# Patient Record
Sex: MALE | Race: WHITE | NOT HISPANIC OR LATINO | Employment: FULL TIME | URBAN - METROPOLITAN AREA
[De-identification: names, ages, dates, MRNs, and addresses within clinical notes are randomized per-mention and may not be internally consistent; named-entity substitution may affect disease eponyms.]

---

## 2021-08-27 ENCOUNTER — TELEPHONE (OUTPATIENT)
Dept: ONCOLOGY | Facility: CLINIC | Age: 46
End: 2021-08-27

## 2021-08-27 NOTE — TELEPHONE ENCOUNTER
HUB TO READ    FA at the hospital is trying to reach this patient, please have them call Mary Ellen Larry [phone], thanks!

## 2021-09-03 ENCOUNTER — LAB (OUTPATIENT)
Dept: OTHER | Facility: HOSPITAL | Age: 46
End: 2021-09-03

## 2021-09-03 ENCOUNTER — CONSULT (OUTPATIENT)
Dept: ONCOLOGY | Facility: CLINIC | Age: 46
End: 2021-09-03

## 2021-09-03 VITALS
HEIGHT: 73 IN | WEIGHT: 196.3 LBS | SYSTOLIC BLOOD PRESSURE: 116 MMHG | OXYGEN SATURATION: 94 % | TEMPERATURE: 98.4 F | DIASTOLIC BLOOD PRESSURE: 74 MMHG | HEART RATE: 96 BPM | BODY MASS INDEX: 26.02 KG/M2 | RESPIRATION RATE: 16 BRPM

## 2021-09-03 DIAGNOSIS — D72.829 LEUKOCYTOSIS, UNSPECIFIED TYPE: Primary | ICD-10-CM

## 2021-09-03 DIAGNOSIS — F17.219 CIGARETTE NICOTINE DEPENDENCE WITH NICOTINE-INDUCED DISORDER: ICD-10-CM

## 2021-09-03 PROBLEM — F17.200 NICOTINE DEPENDENCE: Status: ACTIVE | Noted: 2021-09-03

## 2021-09-03 LAB
BASOPHILS # BLD AUTO: 0.1 10*3/MM3 (ref 0–0.2)
BASOPHILS NFR BLD AUTO: 0.6 % (ref 0–1.5)
DEPRECATED RDW RBC AUTO: 45.1 FL (ref 37–54)
EOSINOPHIL # BLD AUTO: 0.4 10*3/MM3 (ref 0–0.4)
EOSINOPHIL NFR BLD AUTO: 2.4 % (ref 0.3–6.2)
ERYTHROCYTE [DISTWIDTH] IN BLOOD BY AUTOMATED COUNT: 13.7 % (ref 12.3–15.4)
HCT VFR BLD AUTO: 47.7 % (ref 37.5–51)
HGB BLD-MCNC: 15.8 G/DL (ref 13–17.7)
IMM GRANULOCYTES # BLD AUTO: 0.07 10*3/MM3 (ref 0–0.05)
IMM GRANULOCYTES NFR BLD AUTO: 0.4 % (ref 0–0.5)
LYMPHOCYTES # BLD AUTO: 3.87 10*3/MM3 (ref 0.7–3.1)
LYMPHOCYTES NFR BLD AUTO: 23.1 % (ref 19.6–45.3)
MCH RBC QN AUTO: 29.8 PG (ref 26.6–33)
MCHC RBC AUTO-ENTMCNC: 33.1 G/DL (ref 31.5–35.7)
MCV RBC AUTO: 89.8 FL (ref 79–97)
MONOCYTES # BLD AUTO: 0.87 10*3/MM3 (ref 0.1–0.9)
MONOCYTES NFR BLD AUTO: 5.2 % (ref 5–12)
NEUTROPHILS NFR BLD AUTO: 11.41 10*3/MM3 (ref 1.7–7)
NEUTROPHILS NFR BLD AUTO: 68.3 % (ref 42.7–76)
NRBC BLD AUTO-RTO: 0 /100 WBC (ref 0–0.2)
PLATELET # BLD AUTO: 275 10*3/MM3 (ref 140–450)
PMV BLD AUTO: 11.1 FL (ref 6–12)
RBC # BLD AUTO: 5.31 10*6/MM3 (ref 4.14–5.8)
WBC # BLD AUTO: 16.72 10*3/MM3 (ref 3.4–10.8)

## 2021-09-03 PROCEDURE — 85025 COMPLETE CBC W/AUTO DIFF WBC: CPT | Performed by: INTERNAL MEDICINE

## 2021-09-03 PROCEDURE — 99243 OFF/OP CNSLTJ NEW/EST LOW 30: CPT | Performed by: INTERNAL MEDICINE

## 2021-09-03 PROCEDURE — 36415 COLL VENOUS BLD VENIPUNCTURE: CPT

## 2021-09-03 RX ORDER — DICLOFENAC SODIUM 75 MG/1
75 TABLET, DELAYED RELEASE ORAL 2 TIMES DAILY
COMMUNITY
Start: 2021-07-08

## 2021-09-03 RX ORDER — HYDROCODONE BITARTRATE AND ACETAMINOPHEN 5; 325 MG/1; MG/1
1 TABLET ORAL EVERY 6 HOURS PRN
COMMUNITY
Start: 2021-06-24

## 2021-09-03 RX ORDER — TRAZODONE HYDROCHLORIDE 150 MG/1
150 TABLET ORAL
COMMUNITY
Start: 2021-06-14

## 2021-09-03 RX ORDER — ZOLPIDEM TARTRATE 12.5 MG/1
12.5 TABLET, FILM COATED, EXTENDED RELEASE ORAL
COMMUNITY
Start: 2021-08-19

## 2021-09-03 NOTE — PROGRESS NOTES
Subjective     REASON FOR CONSULTATION: Leukocytosis  Provide an opinion on any further workup or treatment                             REQUESTING PHYSICIAN: PATRICIA Samayoa    RECORDS OBTAINED:  Records of the patients history including those obtained from the referring provider were reviewed and summarized in detail.    HISTORY OF PRESENT ILLNESS:  The patient is a 45 y.o. year old male who is here for an opinion about the above issue.  He is referred to us from his primary care office for evaluation of mild leukocytosis.  He had recent lab work from 8/9/2021 showing a white count of 13.9 with 48% polys and 40% lymphocytes.  Hemoglobin and platelets were normal.  Upon review of older blood counts it appears that this is a chronic situation and is ranged from 12,000-17,000.    The patient is a longtime smoker smoking a pack a day currently.  My suspicion is that this is benign mild chronic leukocytosis due to smoking which is caused by demargination of the marginated neutrophil pool along the walls of the blood vessels.    History of Present Illness     Past Medical History:   Diagnosis Date   • Anxiety    • Hyperlipidemia    • Hypertension    • Type 2 diabetes mellitus (CMS/HCC)         History reviewed. No pertinent surgical history.     Current Outpatient Medications on File Prior to Visit   Medication Sig Dispense Refill   • diclofenac (VOLTAREN) 75 MG EC tablet Take 75 mg by mouth 2 (Two) Times a Day.     • HYDROcodone-acetaminophen (NORCO) 5-325 MG per tablet Take 1 tablet by mouth Every 6 (Six) Hours As Needed. for pain     • metFORMIN (GLUCOPHAGE) 500 MG tablet Take 500 mg by mouth 2 (Two) Times a Day With Meals.     • traZODone (DESYREL) 150 MG tablet Take 150 mg by mouth every night at bedtime.     • zolpidem CR (AMBIEN CR) 12.5 MG CR tablet Take 12.5 mg by mouth every night at bedtime.       No current facility-administered medications on file prior to visit.        ALLERGIES:  No Known Allergies  "    Social History     Socioeconomic History   • Marital status:      Spouse name: Angelita   • Number of children: Not on file   • Years of education: Not on file   • Highest education level: Not on file   Tobacco Use   • Smoking status: Current Every Day Smoker     Packs/day: 1.00     Years: 25.00     Pack years: 25.00     Types: Cigarettes        History reviewed. No pertinent family history.     Review of Systems   Constitutional: Negative for activity change, chills, fatigue and fever.   HENT: Negative for mouth sores, trouble swallowing and voice change.    Eyes: Negative for pain and visual disturbance.   Respiratory: Negative for cough, shortness of breath and wheezing.    Cardiovascular: Negative for chest pain and palpitations.   Gastrointestinal: Negative for abdominal pain, constipation, diarrhea, nausea and vomiting.   Genitourinary: Negative for difficulty urinating, frequency and urgency.   Musculoskeletal: Negative for arthralgias and joint swelling.   Skin: Negative for rash.   Neurological: Negative for dizziness, seizures, weakness and headaches.   Hematological: Negative for adenopathy. Does not bruise/bleed easily.   Psychiatric/Behavioral: Negative for behavioral problems and confusion. The patient is nervous/anxious.         Objective     Vitals:    09/03/21 1433   BP: 116/74   Pulse: 96   Resp: 16   Temp: 98.4 °F (36.9 °C)   TempSrc: Temporal   SpO2: 94%   Weight: 89 kg (196 lb 4.8 oz)   Height: 186 cm (73.23\")   PainSc: 0-No pain     No flowsheet data found.    Physical Exam  Constitutional:       General: He is not in acute distress.     Appearance: He is well-developed.   HENT:      Head: Normocephalic.   Eyes:      General: No scleral icterus.     Conjunctiva/sclera: Conjunctivae normal.      Pupils: Pupils are equal, round, and reactive to light.   Neck:      Thyroid: No thyromegaly.      Vascular: No JVD.   Cardiovascular:      Rate and Rhythm: Normal rate and regular rhythm.      " Heart sounds: No murmur heard.   No friction rub. No gallop.    Pulmonary:      Effort: Pulmonary effort is normal.      Breath sounds: Normal breath sounds. No wheezing or rales.   Abdominal:      General: There is no distension.      Palpations: Abdomen is soft. There is no mass.      Tenderness: There is no abdominal tenderness.   Musculoskeletal:         General: No deformity. Normal range of motion.      Cervical back: Normal range of motion and neck supple.   Lymphadenopathy:      Cervical: No cervical adenopathy.   Skin:     General: Skin is warm and dry.      Findings: No erythema or rash.   Neurological:      Mental Status: He is alert and oriented to person, place, and time.      Cranial Nerves: No cranial nerve deficit.      Deep Tendon Reflexes: Reflexes are normal and symmetric.   Psychiatric:         Behavior: Behavior normal.         Judgment: Judgment normal.           RECENT LABS:  Hematology WBC   Date Value Ref Range Status   09/03/2021 16.72 (H) 3.40 - 10.80 10*3/mm3 Final     RBC   Date Value Ref Range Status   09/03/2021 5.31 4.14 - 5.80 10*6/mm3 Final     Hemoglobin   Date Value Ref Range Status   09/03/2021 15.8 13.0 - 17.7 g/dL Final     Hematocrit   Date Value Ref Range Status   09/03/2021 47.7 37.5 - 51.0 % Final     Platelets   Date Value Ref Range Status   09/03/2021 275 140 - 450 10*3/mm3 Final          Assessment/Plan     1.  Mild chronic leukocytosis due to smoking.    Recommendations  1.  I reassured the patient that I do not see any signs of a serious hematologic disorder.  2.  We did discuss the health benefits of smoking cessation.  3.  At this point I do not think I would recommend any further work-up unless the patient were to stop smoking and continued to have significant leukocytosis afterward.    Thanks for asking us to see this nice gentleman in consultation.

## 2021-09-10 ENCOUNTER — TELEPHONE (OUTPATIENT)
Dept: ONCOLOGY | Facility: CLINIC | Age: 46
End: 2021-09-10

## 2021-09-10 NOTE — TELEPHONE ENCOUNTER
Provider: TREVOR    Caller:ADELAIDA    Relationship to Patient: SELF    Phone Number: [phone]    Reason for Call: PT REQUESTED DR MILLER EMAILED TO [email or web address] FOR DR VISIT WITH DR MURRAY ON 9/3/21

## 2022-02-23 ENCOUNTER — TELEPHONE (OUTPATIENT)
Dept: ONCOLOGY | Facility: CLINIC | Age: 47
End: 2022-02-23

## 2022-02-23 NOTE — TELEPHONE ENCOUNTER
Caller: Eric Lay    Relationship: Self        What was the call regarding:     CALLED REGARDING SOME BILLING AND MAKING PAYMENTS ON THAT, OFFERED TO GET HIM OVER TO BILLING DEPT, AND OFFERED TO GIVE HIM THERE NUMBER,     HE DID NOT WANT THE NUMBER BUT WANTED TO BE TRANSFERRED TO BILLING      WARM TRANSFERRED ERIC TO VICKI IN BILLING DEPT TO FURTHER ASSIST.

## 2024-10-01 ENCOUNTER — TELEPHONE (OUTPATIENT)
Dept: FAMILY MEDICINE CLINIC | Facility: CLINIC | Age: 49
End: 2024-10-01
Payer: MEDICAID

## 2024-10-01 NOTE — TELEPHONE ENCOUNTER
----- Message from [facility] sent at 10/1/2024  4:02 PM EDT -----  Regarding: Appointment Request ()  Contact: [phone]  Appointment Request From: Eric Lay    With Provider: Jeffery Etienne [[facility]]    Preferred Date Range: Any date 10/1/2024 or later    Preferred Times: Any    Reason: To address the following health maintenance concerns.  Pneumococcal Vaccine 0-64  Zoster Vaccine  Influenza Vaccine    Comments:  Thx

## 2024-10-02 ENCOUNTER — OFFICE VISIT (OUTPATIENT)
Dept: FAMILY MEDICINE CLINIC | Facility: CLINIC | Age: 49
End: 2024-10-02
Payer: MEDICAID

## 2024-10-02 VITALS
TEMPERATURE: 98.7 F | HEART RATE: 85 BPM | DIASTOLIC BLOOD PRESSURE: 90 MMHG | WEIGHT: 203 LBS | HEIGHT: 73 IN | SYSTOLIC BLOOD PRESSURE: 140 MMHG | BODY MASS INDEX: 26.9 KG/M2 | OXYGEN SATURATION: 97 %

## 2024-10-02 DIAGNOSIS — F43.10 PTSD (POST-TRAUMATIC STRESS DISORDER): ICD-10-CM

## 2024-10-02 DIAGNOSIS — F41.9 ANXIETY: ICD-10-CM

## 2024-10-02 DIAGNOSIS — G40.909 SEIZURE DISORDER: ICD-10-CM

## 2024-10-02 DIAGNOSIS — F32.A DEPRESSION, UNSPECIFIED DEPRESSION TYPE: ICD-10-CM

## 2024-10-02 DIAGNOSIS — M51.362 DEGENERATION OF INTERVERTEBRAL DISC OF LUMBAR REGION WITH DISCOGENIC BACK PAIN AND LOWER EXTREMITY PAIN: Primary | ICD-10-CM

## 2024-10-02 DIAGNOSIS — M50.30 DEGENERATIVE DISC DISEASE, CERVICAL: ICD-10-CM

## 2024-10-02 PROCEDURE — 1159F MED LIST DOCD IN RCRD: CPT | Performed by: FAMILY MEDICINE

## 2024-10-02 PROCEDURE — 1126F AMNT PAIN NOTED NONE PRSNT: CPT | Performed by: FAMILY MEDICINE

## 2024-10-02 PROCEDURE — 99204 OFFICE O/P NEW MOD 45 MIN: CPT | Performed by: FAMILY MEDICINE

## 2024-10-02 PROCEDURE — 1160F RVW MEDS BY RX/DR IN RCRD: CPT | Performed by: FAMILY MEDICINE

## 2024-10-02 RX ORDER — HYDROCODONE BITARTRATE AND ACETAMINOPHEN 7.5; 325 MG/1; MG/1
1 TABLET ORAL 3 TIMES DAILY
COMMUNITY
Start: 2024-08-20

## 2024-10-02 RX ORDER — MELOXICAM 15 MG/1
15 TABLET ORAL DAILY
COMMUNITY
Start: 2024-09-27

## 2024-10-02 RX ORDER — BUSPIRONE HYDROCHLORIDE 5 MG/1
5 TABLET ORAL 3 TIMES DAILY
Qty: 90 TABLET | Refills: 0 | Status: SHIPPED | OUTPATIENT
Start: 2024-10-02

## 2024-10-02 RX ORDER — SERTRALINE HYDROCHLORIDE 25 MG/1
25 TABLET, FILM COATED ORAL NIGHTLY
Qty: 30 TABLET | Refills: 1 | Status: SHIPPED | OUTPATIENT
Start: 2024-10-02
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
## 2024-10-03 ENCOUNTER — PATIENT ROUNDING (BHMG ONLY) (OUTPATIENT)
Dept: FAMILY MEDICINE CLINIC | Facility: CLINIC | Age: 49
End: 2024-10-03
Payer: MEDICAID

## 2024-10-18 ENCOUNTER — TELEPHONE (OUTPATIENT)
Dept: NEUROSURGERY | Facility: CLINIC | Age: 49
End: 2024-10-18

## 2024-10-18 NOTE — TELEPHONE ENCOUNTER
Hub staff attempted to follow warm transfer process and was unsuccessful     Caller: Eric Lay    Relationship to patient: Self    Best call back number: [phone]    Patient is needing: PATIENT CALLED TO RESCHEDULE HIS APPT. FROM THIS MORNING WITH -PATIENT IS ASKING OF THERE IS ANYTHING NEXT WEEK AVAILABLE AS HE IS IN A LOT OF PAIN-PATIENT IS ASKING FOR A CALL BACK TO DISCUSS-TRIED TO WARM TRANSFER AS APPT. WAS STILL IN CHART-UNABLE TO TRANSFER DUE TO NO ANSWER PATIENT ADVISED HE WILL EVEN SEE A DIFFERENT PROVIDER IF NEEDED-SENDING TO OFFICE TO ADVISE THANK YOU

## 2024-10-18 NOTE — TELEPHONE ENCOUNTER
I talked to Eric on the day of his appmt however he came off the elevator uncontrollably vomiting and then vomited in the trash can.  We gave emesis bags to throw up in however, I let him since he is so sick we would need re-schedule.  He stated he would not need to reschedule and he would find another doctor to go to.

## 2024-10-30 ENCOUNTER — OFFICE VISIT (OUTPATIENT)
Dept: FAMILY MEDICINE CLINIC | Facility: CLINIC | Age: 49
End: 2024-10-30
Payer: MEDICAID

## 2024-10-30 ENCOUNTER — TELEPHONE (OUTPATIENT)
Dept: FAMILY MEDICINE CLINIC | Facility: CLINIC | Age: 49
End: 2024-10-30

## 2024-10-30 VITALS
WEIGHT: 203 LBS | TEMPERATURE: 98.4 F | HEIGHT: 73 IN | SYSTOLIC BLOOD PRESSURE: 110 MMHG | HEART RATE: 70 BPM | DIASTOLIC BLOOD PRESSURE: 78 MMHG | OXYGEN SATURATION: 97 % | BODY MASS INDEX: 26.9 KG/M2

## 2024-10-30 DIAGNOSIS — F41.9 ANXIETY: ICD-10-CM

## 2024-10-30 DIAGNOSIS — M50.30 DEGENERATIVE DISC DISEASE, CERVICAL: ICD-10-CM

## 2024-10-30 DIAGNOSIS — F32.A DEPRESSION, UNSPECIFIED DEPRESSION TYPE: ICD-10-CM

## 2024-10-30 DIAGNOSIS — F43.10 PTSD (POST-TRAUMATIC STRESS DISORDER): ICD-10-CM

## 2024-10-30 DIAGNOSIS — M51.362 DEGENERATION OF INTERVERTEBRAL DISC OF LUMBAR REGION WITH DISCOGENIC BACK PAIN AND LOWER EXTREMITY PAIN: Primary | ICD-10-CM

## 2024-10-30 PROCEDURE — 1159F MED LIST DOCD IN RCRD: CPT | Performed by: FAMILY MEDICINE

## 2024-10-30 PROCEDURE — 1160F RVW MEDS BY RX/DR IN RCRD: CPT | Performed by: FAMILY MEDICINE

## 2024-10-30 PROCEDURE — 1125F AMNT PAIN NOTED PAIN PRSNT: CPT | Performed by: FAMILY MEDICINE

## 2024-10-30 PROCEDURE — 99214 OFFICE O/P EST MOD 30 MIN: CPT | Performed by: FAMILY MEDICINE

## 2024-10-30 RX ORDER — BUSPIRONE HYDROCHLORIDE 10 MG/1
1 TABLET ORAL 3 TIMES DAILY
COMMUNITY
Start: 2024-10-22 | End: 2024-12-20

## 2024-10-30 RX ORDER — MELOXICAM 15 MG/1
15 TABLET ORAL DAILY
Qty: 90 TABLET | Refills: 1 | Status: SHIPPED | OUTPATIENT
Start: 2024-10-30

## 2024-10-30 RX ORDER — RISPERIDONE 1 MG/1
1 TABLET ORAL DAILY
COMMUNITY
Start: 2024-10-22 | End: 2024-12-20

## 2024-10-30 NOTE — PROGRESS NOTES
Subjective   Eric Lay is a 49 y.o. male who is here for   Chief Complaint   Patient presents with    Depression    Fall     Hip pain and back pain    Neck Pain    Knee Pain   .     History of Present Illness   History of Present Illness  The patient presents for evaluation of multiple medical concerns.    He experienced a fall on 10/10/2024, resulting in a bruise on his back. The incident occurred when he attempted to jump onto his bed but missed and hit the frame. He also landed on his tailbone, causing discomfort when sitting.     Patient also has chronic low back pain and chronic neck pain.  This has been present for many years.  Patient is scheduled to see neurosurgery later today for evaluation and possible treatment.    Yesterday, while playing with his nephew, he experienced severe pain in his right foot, which he describes as feeling like a fracture. The pain originated in the heel and spread throughout the foot, preventing him from bearing weight on it. He also mentions a sensation of something floating in his ankles, which causes pain when it moves to a certain position. However, pain has resolved today in his foot.    His anxiety is well-managed with medication. He takes BuSpar 10 mg three times a day, although he admits to occasionally forgetting doses. He also takes Risperdal and Zoloft once daily, and trazodone at bedtime. He is seeing [facility] for Behavioral health and is improving. He reports feeling better than he did a month ago and has been able to interact more with his son and nephew.    He is currently undergoing therapy and has an upcoming appointment with a neurologist for his seizures.         He had an upper and lower GI done about 5 years ago.        Review of Systems   Constitutional:  Negative for chills and fever.   Respiratory:  Negative for cough and shortness of breath.    Cardiovascular:  Negative for chest pain and leg swelling.   Musculoskeletal:  Positive for  "arthralgias, back pain, gait problem and neck pain.       Objective   Vitals:    10/30/24 0810   BP: 110/78   Pulse: 70   Temp: 98.4 °F (36.9 °C)   SpO2: 97%   Weight: 92.1 kg (203 lb)   Height: 186 cm (73.23\")      Physical Exam  Vitals and nursing note reviewed.   Constitutional:       Appearance: Normal appearance. He is normal weight.   HENT:      Head: Normocephalic and atraumatic.   Cardiovascular:      Rate and Rhythm: Normal rate and regular rhythm.      Pulses: Normal pulses.      Heart sounds: No murmur heard.  Pulmonary:      Effort: Pulmonary effort is normal. No respiratory distress.      Breath sounds: Normal breath sounds. No wheezing.   Musculoskeletal:      Cervical back: Tenderness and bony tenderness present.      Lumbar back: Tenderness and bony tenderness present.   Skin:     General: Skin is warm and dry.   Neurological:      General: No focal deficit present.      Mental Status: He is alert.      Gait: Gait abnormal (unsteady).      Comments: Walks with cane.    Psychiatric:         Mood and Affect: Mood normal.         Thought Content: Thought content normal.       Physical Exam        Assessment & Plan   Assessment & Plan      Diagnoses and all orders for this visit:    1. Degeneration of intervertebral disc of lumbar region with discogenic back pain and lower extremity pain (Primary)  Patient is scheduled to see Dr. Cabral for further evaluation and possible treatment of degenerative disc disease of lumbar and cervical spine.  -     meloxicam (MOBIC) 15 MG tablet; Take 1 tablet by mouth Daily.  Dispense: 90 tablet; Refill: 1    2. Degenerative disc disease, cervical  Patient is scheduled to see Dr. Cabral for further evaluation and possible treatment of degenerative disc disease of lumbar and cervical spine.  -     meloxicam (MOBIC) 15 MG tablet; Take 1 tablet by mouth Daily.  Dispense: 90 tablet; Refill: 1    3. Anxiety  The patient reports that his anxiety has been well-controlled with his " current medications. He is taking BuSpar 10 mg three times a day, Risperdal once a day, sertraline 50 mg once a day, trazodone at bedtime, and Ambien. He often forgets to take BuSpar during the day but remembers the nighttime dose. The management of these medications will be transferred to his psychiatrist at [facility].  4. PTSD (post-traumatic stress disorder)  Continue follow up with counseling services.    5. Depression, unspecified depression type  The patient reports that his anxiety has been well-controlled with his current medications. He is taking BuSpar 10 mg three times a day, Risperdal once a day, sertraline 50 mg once a day, trazodone at bedtime, and Ambien. He often forgets to take BuSpar during the day but remembers the nighttime dose. The management of these medications will be transferred to his psychiatrist at [facility].    Results      There are no Patient Instructions on file for this visit.    Medications Discontinued During This Encounter   Medication Reason    sertraline (Zoloft) 25 MG tablet Dose adjustment    busPIRone (BUSPAR) 5 MG tablet Dose adjustment    meloxicam (MOBIC) 15 MG tablet Reorder        Return in about 3 months (around 1/30/2025) for Recheck.         Patient or patient representative verbalized consent for the use of Ambient Listening during the visit with  Jeffery Etienne MD for chart documentation. 10/30/2024  09:21 EDT    Jeffery Etienne MD  [place], [place]

## 2024-11-15 ENCOUNTER — TELEPHONE (OUTPATIENT)
Dept: FAMILY MEDICINE CLINIC | Facility: CLINIC | Age: 49
End: 2024-11-15

## 2024-11-15 NOTE — TELEPHONE ENCOUNTER
Caller: Eric Lay    Relationship: Self    Best call back number: [phone]     What was the call regarding: PATIENT HAD A REFERRAL TO DR. SANCHEZ (PAIN MANAGEMENT) BUT HE CAN'T REMEMBER WHO REFERRED TO HIM.  HE IS A [facility] DOCTOR AND HAS REFERRED HIM TO PHYSICAL THERAPY BUT THE PHYSICAL THERAPY FACILITY IS OUT OF NETWORK. PATIENT IS NEEDING HELP TO SEE HOW TO RESOLVE THIS. PLEASE CALL AND ADVISE

## 2024-11-15 NOTE — TELEPHONE ENCOUNTER
Spoke with patient he is aware [facility] takes his insurance for PT . He has the order from Dr. Cabral to take with him.

## 2024-11-26 DIAGNOSIS — M50.30 DEGENERATIVE DISC DISEASE, CERVICAL: ICD-10-CM

## 2024-11-26 DIAGNOSIS — M51.362 DEGENERATION OF INTERVERTEBRAL DISC OF LUMBAR REGION WITH DISCOGENIC BACK PAIN AND LOWER EXTREMITY PAIN: ICD-10-CM

## 2024-11-26 DIAGNOSIS — F51.01 PRIMARY INSOMNIA: Primary | ICD-10-CM

## 2024-11-26 RX ORDER — ZOLPIDEM TARTRATE 12.5 MG/1
12.5 TABLET, FILM COATED, EXTENDED RELEASE ORAL
Status: CANCELLED | OUTPATIENT
Start: 2024-11-26

## 2024-11-26 NOTE — TELEPHONE ENCOUNTER
Caller: Alireza Eric J    Relationship: Self    Best call back number: [phone]     Requested Prescriptions:   Requested Prescriptions     Pending Prescriptions Disp Refills    zolpidem CR (AMBIEN CR) 12.5 MG CR tablet       Sig: Take 1 tablet by mouth every night at bedtime.    meloxicam (MOBIC) 15 MG tablet 90 tablet 1     Sig: Take 1 tablet by mouth Daily.        [address] [phone]  - [phone] FX     Last office visit with prescribing clinician: 10/30/2024   Last telemedicine visit with prescribing clinician: Visit date not found   Next office visit with prescribing clinician: 1/29/2025     Would you like a call back once the refill request has been completed: [] Yes [x] No    If the office needs to give you a call back, can they leave a voicemail: [] Yes [x] No    Surjit White Rep   11/26/24 16:07 EST         DELETE AFTER READING TO PATIENT: “Thank you for sharing this information with me. I will send a message to the clinical team. Please allow 48 hours for the clinical staff to follow up on this request.”

## 2024-11-27 RX ORDER — MELOXICAM 15 MG/1
15 TABLET ORAL DAILY
Qty: 90 TABLET | Refills: 1 | Status: SHIPPED | OUTPATIENT
Start: 2024-11-27

## 2024-11-27 RX ORDER — ZOLPIDEM TARTRATE 12.5 MG/1
12.5 TABLET, FILM COATED, EXTENDED RELEASE ORAL
Qty: 30 TABLET | Refills: 0 | Status: SHIPPED | OUTPATIENT
Start: 2024-11-27

## 2024-11-27 NOTE — TELEPHONE ENCOUNTER
It was my understanding that patient was getting his Ambien from his psychiatrist can you please confirm this.  Will send in meloxicam.

## 2024-12-03 ENCOUNTER — OFFICE VISIT (OUTPATIENT)
Dept: NEUROLOGY | Facility: CLINIC | Age: 49
End: 2024-12-03
Payer: MEDICAID

## 2024-12-03 VITALS
OXYGEN SATURATION: 93 % | SYSTOLIC BLOOD PRESSURE: 118 MMHG | WEIGHT: 213.6 LBS | HEART RATE: 71 BPM | DIASTOLIC BLOOD PRESSURE: 92 MMHG | BODY MASS INDEX: 28 KG/M2

## 2024-12-03 DIAGNOSIS — R56.9 GENERALIZED CONVULSIVE SEIZURES: Primary | ICD-10-CM

## 2024-12-03 PROCEDURE — 1159F MED LIST DOCD IN RCRD: CPT | Performed by: PSYCHIATRY & NEUROLOGY

## 2024-12-03 PROCEDURE — 99204 OFFICE O/P NEW MOD 45 MIN: CPT | Performed by: PSYCHIATRY & NEUROLOGY

## 2024-12-03 PROCEDURE — 1160F RVW MEDS BY RX/DR IN RCRD: CPT | Performed by: PSYCHIATRY & NEUROLOGY

## 2024-12-03 NOTE — PROGRESS NOTES
"Notes by MA:  Mr Lay is here today as a referral from Dr Etienne for seizures. His wife accompanies him today and he verifies consent that we can speak with her regarding his medical care.      Subjective:     Dear Dr. Etienne, I had the pleasure of seeing Mr. Lay in neurological consultation today.  As you know, he is a 49-year-old gentleman who reports about 1 year of seizures.  Most of these are described as staring spells where he apparently loses consciousness but maintains posture and is unaware of his surroundings from 30 seconds to 5 minutes.  There are other episodes where he convulses and falls to the floor.  He has not had any bladder incontinence or significant tongue biting although reports not wearing his dentures at home.  He feels like he has at least 1 \"seizure\" on a daily basis.  He was previously evaluated at [facility].  2 attempts at EMU monitoring were made with the patient left AMA in both cases.  He has clearly been taken off of driving.  He has a history of what he describes as PTSD.  He says that he saw 7 counties in the past for this but did not like their approach to treating him with medications.  Patient ID: Eric Lay is a 49 y.o. male.    Seizures   Associated symptoms include confusion, speech difficulty and cough. Pertinent negatives include no headaches, no visual disturbance, no chest pain, no nausea and no vomiting.     The following portions of the patient's history were reviewed and updated as appropriate: allergies, current medications, past family history, past medical history, past social history, past surgical history, and problem list.    Review of Systems   Constitutional:  Positive for appetite change and fatigue. Negative for activity change.   HENT:  Positive for voice change. Negative for facial swelling and trouble swallowing.    Eyes:  Negative for photophobia, pain and visual disturbance.   Respiratory:  Positive for cough, shortness of breath and wheezing. " Negative for chest tightness.    Cardiovascular:  Negative for chest pain, palpitations and leg swelling.   Gastrointestinal:  Negative for abdominal pain, nausea and vomiting.   Endocrine: Positive for cold intolerance. Negative for heat intolerance.   Musculoskeletal:  Positive for arthralgias, back pain, gait problem, myalgias, neck pain and neck stiffness. Negative for joint swelling.   Neurological:  Positive for tremors, seizures, facial asymmetry (has times when the R side of his face doesn't feel right) and speech difficulty. Negative for dizziness, syncope, weakness, light-headedness, numbness and headaches.   Hematological:  Bruises/bleeds easily.   Psychiatric/Behavioral:  Positive for agitation, behavioral problems, confusion, decreased concentration, dysphoric mood and sleep disturbance. Negative for hallucinations, self-injury and suicidal ideas. The patient is not nervous/anxious and is not hyperactive.         Objective:    Neurological Exam   Awake alert but anxious appearing.  Speech is clear and fluent and speech comprehension is normal.    Cranial nerves II through XII normal and symmetric.    Motor exam reveals reasonable and symmetric tone bulk and power with no drift of the arms.  No spasticity.    Sensory examination reveals symmetric sensation to primary modalities.    Coordination testing reveals smooth and accurate finger-nose-finger.  Rapid alternating movements are rhythmic.  He walks with a limp on the left and is using a cane for ambulatory support.    Tendon reflexes are 1+ and symmetric throughout.  Physical Exam    Assessment/Plan:     Diagnoses and all orders for this visit:    1. Generalized convulsive seizures (Primary)  -     Ambulatory EEG; Future     Seizure-like episodes representing seizures with secondary generalization versus functional seizures.  Favor the latter at this point.  He left AMA from to the EMU evaluations at [facility].  We are moving ahead with a 72-hour  ambulatory EEG to see if we can capture at least 1 of these events before deciding on appropriate therapy.  He is not driving.  He is not operating heavy machinery.  I will review the results of his EEG as it evolves and we will take the next appropriate measures at that time.  Thank you for the opportunity to participate in his care.

## 2024-12-04 ENCOUNTER — OFFICE VISIT (OUTPATIENT)
Dept: FAMILY MEDICINE CLINIC | Facility: CLINIC | Age: 49
End: 2024-12-04
Payer: MEDICAID

## 2024-12-04 VITALS
SYSTOLIC BLOOD PRESSURE: 120 MMHG | HEART RATE: 81 BPM | DIASTOLIC BLOOD PRESSURE: 80 MMHG | WEIGHT: 212 LBS | OXYGEN SATURATION: 96 % | HEIGHT: 73 IN | BODY MASS INDEX: 28.1 KG/M2 | TEMPERATURE: 98.4 F

## 2024-12-04 DIAGNOSIS — F41.9 ANXIETY: ICD-10-CM

## 2024-12-04 DIAGNOSIS — F51.01 PRIMARY INSOMNIA: Primary | ICD-10-CM

## 2024-12-04 DIAGNOSIS — F32.A DEPRESSION, UNSPECIFIED DEPRESSION TYPE: ICD-10-CM

## 2024-12-04 DIAGNOSIS — G40.909 SEIZURE DISORDER: ICD-10-CM

## 2024-12-04 DIAGNOSIS — F43.10 PTSD (POST-TRAUMATIC STRESS DISORDER): ICD-10-CM

## 2024-12-04 PROCEDURE — 1125F AMNT PAIN NOTED PAIN PRSNT: CPT | Performed by: FAMILY MEDICINE

## 2024-12-04 PROCEDURE — 99214 OFFICE O/P EST MOD 30 MIN: CPT | Performed by: FAMILY MEDICINE

## 2024-12-04 PROCEDURE — 1160F RVW MEDS BY RX/DR IN RCRD: CPT | Performed by: FAMILY MEDICINE

## 2024-12-04 PROCEDURE — 1159F MED LIST DOCD IN RCRD: CPT | Performed by: FAMILY MEDICINE

## 2024-12-04 RX ORDER — QUETIAPINE FUMARATE 50 MG/1
50 TABLET, FILM COATED ORAL
COMMUNITY
Start: 2024-11-26 | End: 2024-12-04

## 2024-12-04 RX ORDER — TRAZODONE HYDROCHLORIDE 150 MG/1
150 TABLET ORAL
Qty: 90 TABLET | Refills: 1 | Status: SHIPPED | OUTPATIENT
Start: 2024-12-04

## 2024-12-04 RX ORDER — QUETIAPINE FUMARATE 100 MG/1
100 TABLET, FILM COATED ORAL
COMMUNITY
Start: 2024-11-26 | End: 2024-12-04

## 2024-12-04 RX ORDER — SERTRALINE HYDROCHLORIDE 25 MG/1
25 TABLET, FILM COATED ORAL DAILY
Qty: 90 TABLET | Refills: 3 | Status: SHIPPED | OUTPATIENT
Start: 2024-12-04

## 2024-12-04 NOTE — PROGRESS NOTES
"  Subjective   Eric Lay is a 49 y.o. male who is here for   Chief Complaint   Patient presents with    Med Refill   .     History of Present Illness   History of Present Illness  Eric Lay presents to the office to discuss medications.  Patient has longstanding history of anxiety, depression, PTSD.  He has recently been seeing [facility] for therapy and recently saw psychiatry.  Patient is requesting to see a psychiatrist within [facility].     He is currently taking trazodone 150 mg at bedtime for depression/anxiety and insomnia.  He is also taking buspirone 10 mg 3 times daily and Zoloft 50 mg nightly.  He states that he recently backed down to Zoloft 25 mg nightly due to secondary sexual side effects.  Patient has seen Dr. Castaneda with neurology.  He is being set up for ambulatory EEG due to previous reactions when trying to have a EEG done in hospital.    Review of Systems   Constitutional:  Negative for activity change and appetite change.   Respiratory:  Negative for cough and shortness of breath.    Cardiovascular:  Negative for chest pain and leg swelling.   Skin:  Negative for color change and rash.   Psychiatric/Behavioral:  Positive for confusion, decreased concentration, dysphoric mood and sleep disturbance.        Objective   Vitals:    12/04/24 1448   BP: 120/80   BP Location: Left arm   Patient Position: Sitting   Cuff Size: Large Adult   Pulse: 81   Temp: 98.4 °F (36.9 °C)   SpO2: 96%   Weight: 96.2 kg (212 lb)   Height: 186 cm (73.23\")      Physical Exam  Vitals and nursing note reviewed.   Constitutional:       Appearance: Normal appearance. He is normal weight.   HENT:      Head: Normocephalic and atraumatic.   Cardiovascular:      Rate and Rhythm: Normal rate and regular rhythm.      Pulses: Normal pulses.      Heart sounds: No murmur heard.  Pulmonary:      Effort: Pulmonary effort is normal. No respiratory distress.      Breath sounds: Normal breath sounds. No wheezing. "   Musculoskeletal:      Cervical back: Tenderness present.      Lumbar back: Tenderness present.   Skin:     General: Skin is warm and dry.   Neurological:      General: No focal deficit present.      Mental Status: He is alert.   Psychiatric:         Mood and Affect: Mood normal.         Thought Content: Thought content normal.       Physical Exam        Assessment & Plan   Assessment & Plan    Diagnoses and all orders for this visit:    1. Primary insomnia (Primary)  Continue trazodone 150 mg nightly.  -     Ambulatory Referral to Psychiatry    2. Depression, unspecified depression type  Will decrease sertraline back to 25 mg nightly due to sexual side effects.  Will monitor for improvement of those type of symptoms.  -     Ambulatory Referral to Psychiatry    3. Anxiety  Continue buspirone 10 mg 3 times daily and sertraline 25 mg nightly.  -     Ambulatory Referral to Psychiatry    4. Seizure disorder  Continue follow-up with neurology.  Patient is being scheduled for at home EEG.    5. PTSD (post-traumatic stress disorder)  Continue therapy at [address] [place] [address].  Patient would also like to see a psychiatrist within [facility].  Will refer to psychiatry with [facility].  -     Ambulatory Referral to Psychiatry    Other orders  -     sertraline (Zoloft) 25 MG tablet; Take 1 tablet by mouth Daily.  Dispense: 90 tablet; Refill: 3  -     traZODone (DESYREL) 150 MG tablet; Take 1 tablet by mouth every night at bedtime.  Dispense: 90 tablet; Refill: 1      Results      There are no Patient Instructions on file for this visit.    Medications Discontinued During This Encounter   Medication Reason    RisperDAL 1 MG tablet Patient Reported Not Taking    QUEtiapine (SEROquel) 50 MG tablet Historical Med - Therapy completed    QUEtiapine (SEROquel) 100 MG tablet Historical Med - Therapy completed    sertraline (ZOLOFT) 50 MG tablet Historical Med - Therapy completed    traZODone (DESYREL) 150 MG tablet Reorder        Return if  symptoms worsen or fail to improve.           Jeffery Etienne MD  [facility], [place]  ;a

## 2024-12-06 ENCOUNTER — PATIENT ROUNDING (BHMG ONLY) (OUTPATIENT)
Dept: NEUROLOGY | Facility: CLINIC | Age: 49
End: 2024-12-06
Payer: MEDICAID

## 2024-12-12 ENCOUNTER — TELEPHONE (OUTPATIENT)
Dept: FAMILY MEDICINE CLINIC | Facility: CLINIC | Age: 49
End: 2024-12-12

## 2024-12-12 RX ORDER — OSELTAMIVIR PHOSPHATE 75 MG/1
75 CAPSULE ORAL DAILY
Qty: 10 CAPSULE | Refills: 0 | Status: SHIPPED | OUTPATIENT
Start: 2024-12-12 | End: 2024-12-12 | Stop reason: SDUPTHER

## 2024-12-12 RX ORDER — OSELTAMIVIR PHOSPHATE 75 MG/1
75 CAPSULE ORAL 2 TIMES DAILY
Qty: 10 CAPSULE | Refills: 0 | Status: SHIPPED | OUTPATIENT
Start: 2024-12-12 | End: 2024-12-17

## 2024-12-12 NOTE — TELEPHONE ENCOUNTER
Caller: DENISE LANDRY    Relationship: Emergency Contact    Best call back number: [phone]    What medication are you requesting: TAMIFLU    What are your current symptoms: FEVER, COUGH, WIFE POSITIVE FOR FLU    How long have you been experiencing symptoms: 1 DAY    Have you had these symptoms before:    [x] Yes  [] No    Have you been treated for these symptoms before:   [x] Yes  [] No    If a prescription is needed, what is your preferred pharmacy and phone number:    [facility] [address] [place] [address] [place] - [phone]  - [phone] FX   Additional notes:

## 2024-12-12 NOTE — TELEPHONE ENCOUNTER
Patient's wife called and advised that she tested positive for the FLU on Tuesday and they sleep in the same bed. She advised that her  is now running a fever with body aches. She is requesting Tamiflu for him. He has taken this before and it has helped she advised.      What medication are you requesting: TAMIFLU     What are your current symptoms: FEVER, COUGH, WIFE POSITIVE FOR FLU     How long have you been experiencing symptoms: 1 DAY     Have you had these symptoms before:                                  [x] Yes  [] No     Have you been treated for these symptoms before:              [x] Yes  [] No     If a prescription is needed, what is your preferred pharmacy and phone number:    [facility] [address] [place], [place] - [address] - [phone]  - [phone] FX

## 2024-12-30 DIAGNOSIS — F51.01 PRIMARY INSOMNIA: ICD-10-CM

## 2024-12-30 RX ORDER — ZOLPIDEM TARTRATE 12.5 MG/1
12.5 TABLET, FILM COATED, EXTENDED RELEASE ORAL
Qty: 30 TABLET | Refills: 0 | Status: SHIPPED | OUTPATIENT
Start: 2024-12-30

## 2024-12-30 NOTE — TELEPHONE ENCOUNTER
LS: 12/04/24  NOV: 1/29/25  UDS: 9/23/24  LF: 11/27/24  CONTRACT: none

## 2025-01-10 ENCOUNTER — TELEPHONE (OUTPATIENT)
Dept: NEUROLOGY | Facility: CLINIC | Age: 50
End: 2025-01-10
Payer: MEDICAID

## 2025-01-10 DIAGNOSIS — F43.10 PTSD (POST-TRAUMATIC STRESS DISORDER): Primary | ICD-10-CM

## 2025-01-10 NOTE — TELEPHONE ENCOUNTER
The prolonged EEG is normal.  This is great news.  The spells captured on the study were not seizures.  Recommend referral (if not already involved with psych) to psychology or psychiatry for functional seizures/PTSD.  Okay to cancel next appointment here. V/u  Would like referral to psych.

## 2025-01-10 NOTE — TELEPHONE ENCOUNTER
----- Message from Donnie Castaneda sent at 1/10/2025 10:58 AM EST -----  The prolonged EEG is normal.  This is great news.  The spells captured on the study were not seizures.  Recommend referral (if not already involved with psych) to psychology or psychiatry for functional seizures/PTSD.  Okay to cancel next appointment here.

## 2025-01-23 ENCOUNTER — OFFICE VISIT (OUTPATIENT)
Age: 50
End: 2025-01-23
Payer: MEDICAID

## 2025-01-23 VITALS
OXYGEN SATURATION: 95 % | SYSTOLIC BLOOD PRESSURE: 144 MMHG | DIASTOLIC BLOOD PRESSURE: 89 MMHG | HEART RATE: 95 BPM | BODY MASS INDEX: 27.21 KG/M2 | WEIGHT: 212 LBS | HEIGHT: 74 IN

## 2025-01-23 DIAGNOSIS — F41.0 GENERALIZED ANXIETY DISORDER WITH PANIC ATTACKS: ICD-10-CM

## 2025-01-23 DIAGNOSIS — F33.3 MDD (MAJOR DEPRESSIVE DISORDER), RECURRENT, SEVERE, WITH PSYCHOSIS: Primary | ICD-10-CM

## 2025-01-23 DIAGNOSIS — F41.9 ANXIETY: ICD-10-CM

## 2025-01-23 DIAGNOSIS — Z79.899 MEDICATION MANAGEMENT: ICD-10-CM

## 2025-01-23 DIAGNOSIS — F32.A DEPRESSION, UNSPECIFIED DEPRESSION TYPE: ICD-10-CM

## 2025-01-23 DIAGNOSIS — F43.10 POST TRAUMATIC STRESS DISORDER (PTSD): ICD-10-CM

## 2025-01-23 DIAGNOSIS — F41.1 GENERALIZED ANXIETY DISORDER WITH PANIC ATTACKS: ICD-10-CM

## 2025-01-23 RX ORDER — BUSPIRONE HYDROCHLORIDE 15 MG/1
15 TABLET ORAL 3 TIMES DAILY
COMMUNITY

## 2025-01-23 RX ORDER — SERTRALINE HYDROCHLORIDE 25 MG/1
25 TABLET, FILM COATED ORAL DAILY
Qty: 90 TABLET | Refills: 3 | OUTPATIENT
Start: 2025-01-23
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
## 2025-01-24 ENCOUNTER — TELEPHONE (OUTPATIENT)
Age: 50
End: 2025-01-24
Payer: MEDICAID

## 2025-01-24 RX ORDER — BREXPIPRAZOLE 0.5 MG/1
0.5 TABLET ORAL DAILY
Qty: 30 TABLET | Refills: 1 | Status: SHIPPED | OUTPATIENT
Start: 2025-01-24

## 2025-01-24 NOTE — TELEPHONE ENCOUNTER
Patient called and stated that he went to  a prescription for Rexulti at his pharmacy but there was no RX at the pharmacy. Did you prescribe him Rexulti? He said you gave him a discount card.

## 2025-01-24 NOTE — TELEPHONE ENCOUNTER
Is there anyway you could complete his note so I can complete PA? Also do you know if he has tried any other medications?

## 2025-01-27 ENCOUNTER — TELEPHONE (OUTPATIENT)
Age: 50
End: 2025-01-27
Payer: MEDICAID

## 2025-01-27 NOTE — TELEPHONE ENCOUNTER
Left VM about his new prescription not being filled yet. A return call was placed to inform him that a prior authorization was needed and it should be in by 1/28/25.

## 2025-01-29 ENCOUNTER — OFFICE VISIT (OUTPATIENT)
Dept: FAMILY MEDICINE CLINIC | Facility: CLINIC | Age: 50
End: 2025-01-29
Payer: MEDICAID

## 2025-01-29 ENCOUNTER — HOSPITAL ENCOUNTER (OUTPATIENT)
Dept: GENERAL RADIOLOGY | Facility: HOSPITAL | Age: 50
Discharge: HOME | End: 2025-01-29
Admitting: FAMILY MEDICINE
Payer: MEDICAID

## 2025-01-29 VITALS
OXYGEN SATURATION: 96 % | TEMPERATURE: 97.5 F | SYSTOLIC BLOOD PRESSURE: 116 MMHG | BODY MASS INDEX: 27.34 KG/M2 | WEIGHT: 213 LBS | DIASTOLIC BLOOD PRESSURE: 84 MMHG | HEIGHT: 74 IN | HEART RATE: 70 BPM

## 2025-01-29 DIAGNOSIS — M25.512 CHRONIC LEFT SHOULDER PAIN: ICD-10-CM

## 2025-01-29 DIAGNOSIS — Z13.220 LIPID SCREENING: ICD-10-CM

## 2025-01-29 DIAGNOSIS — F43.10 PTSD (POST-TRAUMATIC STRESS DISORDER): ICD-10-CM

## 2025-01-29 DIAGNOSIS — G40.909 SEIZURE DISORDER: ICD-10-CM

## 2025-01-29 DIAGNOSIS — Z13.1 SCREENING FOR DIABETES MELLITUS: ICD-10-CM

## 2025-01-29 DIAGNOSIS — G89.29 CHRONIC LEFT SHOULDER PAIN: ICD-10-CM

## 2025-01-29 DIAGNOSIS — F32.A DEPRESSION, UNSPECIFIED DEPRESSION TYPE: Primary | ICD-10-CM

## 2025-01-29 DIAGNOSIS — F51.01 PRIMARY INSOMNIA: ICD-10-CM

## 2025-01-29 PROCEDURE — 1125F AMNT PAIN NOTED PAIN PRSNT: CPT | Performed by: FAMILY MEDICINE

## 2025-01-29 PROCEDURE — 99214 OFFICE O/P EST MOD 30 MIN: CPT | Performed by: FAMILY MEDICINE

## 2025-01-29 PROCEDURE — 73030 X-RAY EXAM OF SHOULDER: CPT

## 2025-01-29 NOTE — PROGRESS NOTES
Subjective   Eric Lay is a 49 y.o. male who is here for   Chief Complaint   Patient presents with    Primary Care Follow-Up   .     Primary Care Follow-UpPertinent negatives include no chest pain, no shortness of breath and no cough.      History of Present Illness  The patient presents for evaluation of pseudodementia, left shoulder pain, and insomnia.    He has been under the care of Dr. Chris, a psychiatrist, for medication management. He was initially prescribed quetiapine, which he declined to take due to concerns about potential side effects. Subsequently, he was started on Rexulti, which he believes is beneficial. He reports experiencing auditory hallucinations, describing them as indistinct murmurs. He also experiences panic attacks, which can last between 12 to 24 hours, during which he struggles to calm himself. Even after the panic attacks subside, he continues to feel agitated for several days. He has been diagnosed with pseudodementia and reports difficulty with focus and memory, which he attributes to his mood disorders, anxiety, and depression. He has consulted with Dr. Babcock, a neurologist, who ruled out epileptic seizures and referred him back to psychiatry. He has been experiencing increased difficulty with speech and communication, which he feels is worsening. He has been taking Ambien since 2020 and reports experiencing brain fog following a COVID-19 infection, which has not resolved. He has an upcoming appointment with psychiatry 02/2025.    He has been using a cane for mobility, which has resulted in left shoulder pain. He has not received a cortisone injection in approximately 15 years. He has a history of rotator cuff tear, which was not surgically repaired, and has sustained multiple shoulder injuries over the years. He has not undergone physical therapy or had recent x-rays of his shoulder.    He reports difficulty sleeping, often lying awake until midnight and waking up around  "3:00 AM. He has been using Ambien CR and trazodone as sleep aids but continues to struggle with sleep. He also reports that his eyes remain half-open during sleep.    MEDICATIONS  Current: Gildai, Ambien CR, trazodone    Review of Systems   Constitutional:  Negative for activity change and appetite change.   Respiratory:  Negative for cough and shortness of breath.    Cardiovascular:  Negative for chest pain and leg swelling.   Skin:  Negative for color change and rash.       Objective   Vitals:    01/29/25 0754   BP: 116/84   BP Location: Left arm   Patient Position: Sitting   Cuff Size: Adult   Pulse: 70   Temp: 97.5 °F (36.4 °C)   SpO2: 96%   Weight: 96.6 kg (213 lb)   Height: 188 cm (74.02\")      Physical Exam  Vitals and nursing note reviewed.   Constitutional:       Appearance: Normal appearance. He is normal weight.   HENT:      Head: Normocephalic and atraumatic.   Cardiovascular:      Rate and Rhythm: Normal rate and regular rhythm.      Pulses: Normal pulses.      Heart sounds: No murmur heard.  Pulmonary:      Effort: Pulmonary effort is normal. No respiratory distress.      Breath sounds: Normal breath sounds. No wheezing.   Musculoskeletal:      Left shoulder: Tenderness and bony tenderness present. Decreased range of motion.      Comments:  +impingement on left, unable to abduct arm from left side.decreased internal and external rotation from pain.   Skin:     General: Skin is warm and dry.   Neurological:      General: No focal deficit present.      Mental Status: He is alert.   Psychiatric:         Mood and Affect: Mood normal.         Thought Content: Thought content normal.       Physical Exam        Assessment & Plan   Assessment & Plan    Diagnoses and all orders for this visit:    1. Depression, unspecified depression type (Primary)  Continue to follow-up with psychiatry for treatment of mood disorder, anxiety and depression.  -     Comprehensive Metabolic Panel  -     Hemoglobin A1c  -     TSH " Rfx On Abnormal To Free T4  -     Lipid Panel  -     CBC & Differential    2. Primary insomnia  He reports difficulty sleeping despite using Ambien CR and trazodone. He is advised to continue his current medication regimen and follow up with psychiatry to address his insomnia. If symptoms persist, alternative sleep aids or adjustments to his current medications may be considered.  -     Hemoglobin A1c  -     Lipid Panel  -     CBC & Differential    3. Seizure disorder  The prolonged EEG results were normal, indicating no seizure activity. No follow up with neurology is scheduled. It sis recommended patient continue to follow up with psychiatry.   -     Hemoglobin A1c  -     Lipid Panel  -     CBC & Differential    4. PTSD (post-traumatic stress disorder)  -     Hemoglobin A1c  -     Lipid Panel  -     CBC & Differential    5. Chronic left shoulder pain  An x-ray of the left shoulder will be ordered to evaluate the underlying cause of the pain. A referral for physical therapy will be made to assist in improving his range of motion and overall comfort. If the x-ray shows significant findings, further treatment options will be considered.  -     XR Shoulder 2+ View Left; Future  -     Ambulatory Referral to Physical Therapy for Evaluation & Treatment    6. Lipid screening  -     Lipid Panel    7. Screening for diabetes mellitus  -     Hemoglobin A1c      Results  Imaging  Prolonged EEG was normal, showing no signs of seizures.    There are no Patient Instructions on file for this visit.    There are no discontinued medications.     Return in about 4 weeks (around 2/26/2025), or Left shoulder pain..         Patient or patient representative verbalized consent for the use of Ambient Listening during the visit with  Jeffery Etienne MD for chart documentation. 1/29/2025  10:10 EST    Jeffery Etienne MD  [place], [place]

## 2025-01-29 NOTE — PROGRESS NOTES
X-ray of shoulder does not reveal any osteoarthritis.  Rotator cuff pathology cannot be assessed via x-ray.  Will need to try physical therapy and then possibly have MRI.

## 2025-01-30 DIAGNOSIS — F51.01 PRIMARY INSOMNIA: ICD-10-CM

## 2025-01-30 LAB
ALBUMIN SERPL-MCNC: 4.4 G/DL (ref 3.5–5.2)
ALBUMIN/GLOB SERPL: 1.3 G/DL
ALP SERPL-CCNC: 77 U/L (ref 39–117)
ALT SERPL-CCNC: 34 U/L (ref 1–41)
AST SERPL-CCNC: 19 U/L (ref 1–40)
BASOPHILS # BLD AUTO: 0.09 10*3/MM3 (ref 0–0.2)
BASOPHILS NFR BLD AUTO: 0.8 % (ref 0–1.5)
BILIRUB SERPL-MCNC: 0.5 MG/DL (ref 0–1.2)
BUN SERPL-MCNC: 19 MG/DL (ref 6–20)
BUN/CREAT SERPL: 14.3 (ref 7–25)
CALCIUM SERPL-MCNC: 9.8 MG/DL (ref 8.6–10.5)
CHLORIDE SERPL-SCNC: 105 MMOL/L (ref 98–107)
CHOLEST SERPL-MCNC: 216 MG/DL (ref 0–200)
CO2 SERPL-SCNC: 22.6 MMOL/L (ref 22–29)
CREAT SERPL-MCNC: 1.33 MG/DL (ref 0.76–1.27)
EGFRCR SERPLBLD CKD-EPI 2021: 64.7 ML/MIN/1.73
EOSINOPHIL # BLD AUTO: 0.47 10*3/MM3 (ref 0–0.4)
EOSINOPHIL NFR BLD AUTO: 4.2 % (ref 0.3–6.2)
ERYTHROCYTE [DISTWIDTH] IN BLOOD BY AUTOMATED COUNT: 13.7 % (ref 12.3–15.4)
GLOBULIN SER CALC-MCNC: 3.4 GM/DL
GLUCOSE SERPL-MCNC: 93 MG/DL (ref 65–99)
HBA1C MFR BLD: 5.5 % (ref 4.8–5.6)
HCT VFR BLD AUTO: 52.5 % (ref 37.5–51)
HDLC SERPL-MCNC: 38 MG/DL (ref 40–60)
HGB BLD-MCNC: 17.6 G/DL (ref 13–17.7)
IMM GRANULOCYTES # BLD AUTO: 0.07 10*3/MM3 (ref 0–0.05)
IMM GRANULOCYTES NFR BLD AUTO: 0.6 % (ref 0–0.5)
LDLC SERPL CALC-MCNC: 158 MG/DL (ref 0–100)
LYMPHOCYTES # BLD AUTO: 4.03 10*3/MM3 (ref 0.7–3.1)
LYMPHOCYTES NFR BLD AUTO: 35.8 % (ref 19.6–45.3)
MCH RBC QN AUTO: 30.9 PG (ref 26.6–33)
MCHC RBC AUTO-ENTMCNC: 33.5 G/DL (ref 31.5–35.7)
MCV RBC AUTO: 92.1 FL (ref 79–97)
MONOCYTES # BLD AUTO: 0.89 10*3/MM3 (ref 0.1–0.9)
MONOCYTES NFR BLD AUTO: 7.9 % (ref 5–12)
NEUTROPHILS # BLD AUTO: 5.71 10*3/MM3 (ref 1.7–7)
NEUTROPHILS NFR BLD AUTO: 50.7 % (ref 42.7–76)
NRBC BLD AUTO-RTO: 0 /100 WBC (ref 0–0.2)
PLATELET # BLD AUTO: 297 10*3/MM3 (ref 140–450)
POTASSIUM SERPL-SCNC: 4.8 MMOL/L (ref 3.5–5.2)
PROT SERPL-MCNC: 7.8 G/DL (ref 6–8.5)
RBC # BLD AUTO: 5.7 10*6/MM3 (ref 4.14–5.8)
SODIUM SERPL-SCNC: 141 MMOL/L (ref 136–145)
TRIGL SERPL-MCNC: 111 MG/DL (ref 0–150)
TSH SERPL DL<=0.005 MIU/L-ACNC: 2.51 UIU/ML (ref 0.27–4.2)
VLDLC SERPL CALC-MCNC: 20 MG/DL (ref 5–40)
WBC # BLD AUTO: 11.26 10*3/MM3 (ref 3.4–10.8)

## 2025-01-30 RX ORDER — ZOLPIDEM TARTRATE 12.5 MG/1
12.5 TABLET, FILM COATED, EXTENDED RELEASE ORAL
Qty: 30 TABLET | Refills: 0 | Status: SHIPPED | OUTPATIENT
Start: 2025-01-30

## 2025-02-11 ENCOUNTER — OFFICE VISIT (OUTPATIENT)
Age: 50
End: 2025-02-11
Payer: MEDICAID

## 2025-02-11 DIAGNOSIS — F41.1 GENERALIZED ANXIETY DISORDER: ICD-10-CM

## 2025-02-11 DIAGNOSIS — F43.10 POST TRAUMATIC STRESS DISORDER (PTSD): ICD-10-CM

## 2025-02-11 DIAGNOSIS — F33.3 SEVERE EPISODE OF RECURRENT MAJOR DEPRESSIVE DISORDER, WITH PSYCHOTIC FEATURES: Primary | ICD-10-CM
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
## 2025-02-14 ENCOUNTER — PATIENT ROUNDING (BHMG ONLY) (OUTPATIENT)
Age: 50
End: 2025-02-14
Payer: MEDICAID

## 2025-02-14 NOTE — PROGRESS NOTES
A My-Chart message has been sent to the patient for PATIENT ROUNDING with [facility]

## 2025-02-21 ENCOUNTER — OFFICE VISIT (OUTPATIENT)
Age: 50
End: 2025-02-21
Payer: MEDICAID

## 2025-02-21 VITALS
HEART RATE: 88 BPM | DIASTOLIC BLOOD PRESSURE: 82 MMHG | OXYGEN SATURATION: 96 % | WEIGHT: 214.8 LBS | SYSTOLIC BLOOD PRESSURE: 128 MMHG | BODY MASS INDEX: 27.57 KG/M2

## 2025-02-21 DIAGNOSIS — G47.9 SLEEP DISTURBANCE: ICD-10-CM

## 2025-02-21 DIAGNOSIS — F41.1 GENERALIZED ANXIETY DISORDER WITH PANIC ATTACKS: ICD-10-CM

## 2025-02-21 DIAGNOSIS — F41.0 GENERALIZED ANXIETY DISORDER WITH PANIC ATTACKS: ICD-10-CM

## 2025-02-21 DIAGNOSIS — F33.3 MDD (MAJOR DEPRESSIVE DISORDER), RECURRENT, SEVERE, WITH PSYCHOSIS: Primary | ICD-10-CM

## 2025-02-21 DIAGNOSIS — F43.10 POST TRAUMATIC STRESS DISORDER (PTSD): ICD-10-CM

## 2025-02-21 RX ORDER — BUSPIRONE HYDROCHLORIDE 15 MG/1
15 TABLET ORAL 2 TIMES DAILY
Qty: 60 TABLET | Refills: 1 | Status: SHIPPED | OUTPATIENT
Start: 2025-02-21 | End: 2025-04-22

## 2025-02-21 NOTE — PROGRESS NOTES
"     Office  Follow Up Visit      Patient Name: Eric Lay  : 1975   MRN: [id number]     Referring Provider: Jeffery Etienne MD    Chief Complaint:  \"anxiety and depression\"    ICD-10-CM ICD-9-CM   1. MDD (major depressive disorder), recurrent, severe, with psychosis  F33.3 296.34   2. Sleep disturbance  G47.9 780.50   3. Generalized anxiety disorder with panic attacks  F41.1 300.02    F41.0 300.01   4. Post traumatic stress disorder (PTSD)  F43.10 309.81      History of Present Illness:   Eric Lay is a 49 y.o. male who is here today for follow up. Patient was seen for initial evaluation on 2025.  Patient reported a history of PTSD, depression, and anxiety.  Reported that he has been dealing with anxiety, depression, and trauma related symptoms majority of his life.  Patient also reported that he has had difficulty with memory for a little over a year without any known cause (patient did talk to his neurologist about this).  Patient started seeing neurology after experiencing seizure-like episodes that started about a year ago.  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  Patient came to provider taking Zoloft 25 mg daily, trazodone 150 mg nightly, Ambien 12.5 mg nightly, and BuSpar 15 mg twice daily.  Patient has tried to increase Zoloft to 50 mg daily but experienced sexual side effects.  During initial evaluation patient was diagnosed with MDD, PTSD, and DILLON with panic attacks.  Discussed with patient that his memory issues may be related to his depression.  Discussed with patient that we will focus on treating patient's depression and trauma related symptoms which will ultimately hopefully help patient's memory and nonepileptic seizures.  Patient did report during this evaluation that he does experience some vague auditory hallucinations which may be related to his depression.  Patient was started on " "Rexulti 0.5 mg daily to help with depressive symptoms and trauma related symptoms.  Patient was continued on Zoloft 25 mg daily, Ambien 12.5 mg nightly, trazodone 150 mg nightly, and BuSpar 15 mg twice daily.  Notify patient that we would most likely change his antidepressant during next visit.    Patient does reports some improvement in anxiety, depressive symptoms, and trauma related symptoms with the added Rexulti but does still report to feel depressed and anxious nearly every day.  Reports that he still has passive thoughts of death but denies any intent or plan to harm himself.  Patient adamantly denies SI today. Reports that since starting Rexulti \"there is not so much of a cloud over my head\".  Denies any side effects to his medications.  Reports to still be sleeping 3 to 4 hours each night even with taking trazodone 150 mg nightly and Ambien 12.5 mg nightly.  Admits that he does snore and wake up several times throughout the night.  Denies any naps.  States his appetite is still poor.  Denies any seizures since last appointment but states he has had some episodes of \"spacing out\".  Patient states that he was told by neurology that he did not need to follow-up with neurology.  Denies SI/HI/AVH.    Subjective      Review of Systems:   Review of Systems   Constitutional:  Positive for appetite change.   Respiratory:  Negative for chest tightness and shortness of breath.    Gastrointestinal:  Negative for abdominal pain, constipation, diarrhea, nausea and vomiting.   Genitourinary:  Negative for difficulty urinating.   Neurological:  Negative for headaches.   Psychiatric/Behavioral:  Negative for hallucinations and sleep disturbance. The patient is nervous/anxious.         Depressed       PHQ-9 Depression Screening  Little interest or pleasure in doing things? Almost all   Feeling down, depressed, or hopeless? Almost all   PHQ-2 Total Score 6   Trouble falling or staying asleep, or sleeping too much? Almost all "   Feeling tired or having little energy? Almost all   Poor appetite or overeating? Almost all   Feeling bad about yourself - or that you are a failure or have let yourself or your family down? Almost all   Trouble concentrating on things, such as reading the newspaper or watching television? Almost all   Moving or speaking so slowly that other people could have noticed? Or the opposite - being so fidgety or restless that you have been moving around a lot more than usual? Almost all   Thoughts that you would be better off dead, or of hurting yourself in some way? Over half   PHQ-9 Total Score 26   If you checked off any problems, how difficult have these problems made it for you to do your work, take care of things at home, or get along with other people? Extremely difficult     DILLON-7      Over the last two weeks, how often have you been bothered by the following problems?  Feeling nervous, anxious or on edge: Nearly every day  Not being able to stop or control worrying: Nearly every day  Worrying too much about different things: Nearly every day  Trouble Relaxing: Nearly every day  Being so restless that it is hard to sit still: More than half the days  Becoming easily annoyed or irritable: Nearly every day  Feeling afraid as if something awful might happen: Nearly every day  DILLON 7 Total Score: 20  If you checked any problems, how difficult have these problems made it for you to do your work, take care of things at home, or get along with other people: Extremely difficult    Patient History:   The following portions of the patient's history were reviewed and updated as appropriate: allergies, current medications, past family history, past medical history, past social history, past surgical history and problem list.     Social History     Socioeconomic History    Marital status:      Spouse name: Angelita    Years of education: High school   Tobacco Use    Smoking status: Every Day     Current packs/day: 1.00      Average packs/day: 1 pack/day for 25.0 years (25.0 ttl pk-yrs)     Types: Cigarettes    Smokeless tobacco: Never   Vaping Use    Vaping status: Never Used   Substance and Sexual Activity    Alcohol use: Never    Drug use: Yes    Sexual activity: Yes     Partners: Female     Birth control/protection: None     Comment:        Medications:     Current Outpatient Medications:     busPIRone (BUSPAR) 15 MG tablet, Take 1 tablet by mouth 2 (Two) Times a Day for 60 days., Disp: 60 tablet, Rfl: 1    Brexpiprazole (Rexulti) 0.5 MG tablet, Take 0.5 mg by mouth Daily., Disp: 30 tablet, Rfl: 1    FLUoxetine (PROzac) 20 MG capsule, Take 1 capsule by mouth Daily for 60 days. Day 1-3: Take Zoloft 25mg daily and Prozac 20mg daily. Day 4: Stop Zoloft and continue Prozac 20mg daily until next visit., Disp: 30 capsule, Rfl: 1    HYDROcodone-acetaminophen (NORCO) 7.5-325 MG per tablet, Take 1 tablet by mouth 3 times a day., Disp: , Rfl:     meloxicam (MOBIC) 15 MG tablet, Take 1 tablet by mouth Daily., Disp: 90 tablet, Rfl: 1    naloxone (NARCAN) 4 MG/0.1ML nasal spray, Administer 1 spray into the nostril(s) as directed by provider As Needed., Disp: , Rfl:     traZODone (DESYREL) 150 MG tablet, Take 1 tablet by mouth every night at bedtime., Disp: 90 tablet, Rfl: 1    zolpidem CR (AMBIEN CR) 12.5 MG CR tablet, Take 1 tablet by mouth every night at bedtime., Disp: 30 tablet, Rfl: 0    Objective     Physical Exam:  Vital Signs:   Vitals:    02/21/25 1138   BP: 128/82   Pulse: 88   SpO2: 96%   Weight: 97.4 kg (214 lb 12.8 oz)     Body mass index is 27.57 kg/m².     Mental Status Exam:   MENTAL STATUS EXAM   General Appearance:  Cleanly groomed and dressed and looks older than stated age  Eye Contact:  Good eye contact  Attitude:  Cooperative  Motor Activity:  Normal gait, posture  Muscle Strength:  Normal  Speech:  Normal rate, tone, volume  Language:  Spontaneous  Mood and affect:  Depressed and anxious  Thought Process:   Logical  Associations/ Thought Content:  No delusions  Hallucinations:  None  Suicidal Ideations:  Not present  Homicidal Ideation:  Not present  Sensorium:  Alert and clear  Orientation:  Person, place, time and situation  Attention Span/ Concentration:  Good  Fund of Knowledge:  Appropriate for age and educational level  Intellectual Functioning:  Average range  Insight:  Good  Judgement:  Good  Reliability:  Good  Impulse Control:  Good       @RESULASTCBCDIFFPANEL,TSH,LABLIPI,NDXVYQBD78,ZJTRDCKS12,MG,FOLATE,PROLACTIN,CRPRESULT,CMP,H7TUPMVGWOC)@    Lab Results   Component Value Date    GLUCOSE 93 01/29/2025    BUN 19 01/29/2025    CREATININE 1.33 (H) 01/29/2025    EGFR 64.7 01/29/2025    BCR 14.3 01/29/2025    K 4.8 01/29/2025    CO2 22.6 01/29/2025    CALCIUM 9.8 01/29/2025    ALBUMIN 4.4 01/29/2025    BILITOT 0.5 01/29/2025    AST 19 01/29/2025    ALT 34 01/29/2025       Lab Results   Component Value Date    WBC 11.26 (H) 01/29/2025    HGB 17.6 01/29/2025    HCT 52.5 (H) 01/29/2025    MCV 92.1 01/29/2025     01/29/2025       Lab Results   Component Value Date    CHLPL 216 (H) 01/29/2025    TRIG 111 01/29/2025    HDL 38 (L) 01/29/2025     (H) 01/29/2025       TSH          1/29/2025    08:35   TSH   TSH 2.510           Assessment / Plan      Assessment:   Pt is a 50 yo male with a history of MDD, PTSD, and DILLON with panic attacks.  Patient was seen for initial evaluation on 1/23/2025.  Patient was diagnosed with MDD, PTSD, and DILLON with panic attacks.  Patient also reported that he has had difficulty with memory for a little over a year without any known cause (patient did talk to his neurologist about this).  Patient started seeing neurology after experiencing seizure-like episodes that started about a year ago.  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  Patient came to provider taking Zoloft 25 mg daily, trazodone 150 mg  "nightly, Ambien 12.5 mg nightly, and BuSpar 15 mg twice daily.  Patient has tried to increase Zoloft to 50 mg daily but experienced sexual side effects.  During initial evaluation patient was diagnosed with MDD, PTSD, and DILLON with panic attacks.  Discussed with patient that his memory issues may be related to his depression.  Discussed with patient that we will focus on treating patient's depression and trauma related symptoms which will ultimately hopefully help patient's memory and nonepileptic seizures.  Patient did report during this evaluation that he does experience some vague auditory hallucinations which may be related to his depression.  Patient was started on Rexulti 0.5 mg daily to help with depressive symptoms and trauma related symptoms.  Patient was continued on Zoloft 25 mg daily, Ambien 12.5 mg nightly, trazodone 150 mg nightly, and BuSpar 15 mg twice daily.  Notify patient that we would most likely change his antidepressant during next visit.  Today patient reports some improvement in anxiety, depression, and trauma-related symptoms with the added Rexulti but patient's PHQ-9 and DILLON-7 scores are still very elevated today.  Patient continues to have episodes of \"spacing out\" which neurology believes are non epileptic seizures.  Would like to try patient on a different antidepressant to help with anxiety, depressive symptoms, and trauma related symptoms.  Patient did experience side effects with increasing the Zoloft to 50 mg daily, so will cross-taper patient from Zoloft to Prozac.  Taper schedule below.  Encourage patient to continue with therapy.  Will see patient in 4 weeks.  Will continue Rexulti 0.5 mg daily, BuSpar 15 mg twice daily, trazodone 150 mg nightly, and Ambien 12.5 mg nightly as needed for sleep.  Discussed with patient that we will continue to try to treat his anxiety, depression, and trauma related symptoms which will hopefully ultimately treat his nonepileptic seizures and memory " issues.  Safety plan reviewed with the patient can be found in patient's chart.  Referral placed to sleep medicine to rule out sleep apnea.    Taper schedule:  Day 1-3: Take Zoloft 25mg daily and Prozac 20mg daily. Day 4: Stop Zoloft and continue Prozac 20mg daily until next visit.     Diagnoses and all orders for this visit:    1. MDD (major depressive disorder), recurrent, severe, with psychosis (Primary)    2. Sleep disturbance  -     Ambulatory Referral to Sleep Medicine    3. Generalized anxiety disorder with panic attacks    4. Post traumatic stress disorder (PTSD)    Other orders  -     busPIRone (BUSPAR) 15 MG tablet; Take 1 tablet by mouth 2 (Two) Times a Day for 60 days.  Dispense: 60 tablet; Refill: 1  -     FLUoxetine (PROzac) 20 MG capsule; Take 1 capsule by mouth Daily for 60 days. Day 1-3: Take Zoloft 25mg daily and Prozac 20mg daily. Day 4: Stop Zoloft and continue Prozac 20mg daily until next visit.  Dispense: 30 capsule; Refill: 1       Plan:   - Cross taper pt off Zoloft and start Prozac 20mg.   - Will continue Rexulti 0.5 mg daily, BuSpar 15 mg twice daily, trazodone 150 mg nightly, and Ambien 12.5 mg nightly as needed for sleep.   - Continue with therapy in clinic.  - Discussed medication options and treatment plan of prescribed medication as well as the risks, benefits, and side effects   - Encouraged pt to continue supportive psychotherapy efforts and medications as indicated.  - Educated pt on signs and symptoms of serotonin syndrome and notified pt to go to the ER if experiencing these symptoms.   - Notified pt that antidepressants can sometimes cause worsening SI and to monitor for this.   - Notified pt that antipsychotics can increase cholesterol levels, blood sugar, wt, and BP.   - Educated pt on EPS/TD and to notify provider is they experience these symptoms.      Short-term goals: Continue to develop rapport with patient.     Long-term goals: Symptom reduction with medication and  therapy.     Weakness: Tobacco use.     Strengths: Great support from wife.    Continue supportive psychotherapy efforts and medications as indicated. Treatment and medication options discussed during today's visit. Patient ackowledged and verbally consented to continue with current treatment plan and was educated on the importance of compliance with treatment and follow-up appointments. Patient seems reasonably able to adhere to treatment plan.      Medication Considerations:  Discussed medication options and treatment plan of prescribed medication(s) as well as the risks, benefits, and potential side effects. Patient is agreeable to call the office with any worsening of symptoms or onset of side effects. Patient is agreeable to call 911 or go to the nearest ER should he/she begin having SI/HI.    Quality Measures:   Patient does smoke 1 pack a day. Provided education on smoking cessation.     Dread reviewed and is appropriate.    Follow Up:   Return in about 4 weeks (around 3/21/2025) for Recheck.    Copied text in this note has been reviewed and is accurate as of 3/20/2025.    Part of this note may be an electronic transcription/translation of spoken language to printed text using the Dragon Dictation System.    PATRICIA Covington, PMHNP-BC

## 2025-02-25 ENCOUNTER — OFFICE VISIT (OUTPATIENT)
Age: 50
End: 2025-02-25
Payer: MEDICAID

## 2025-02-25 DIAGNOSIS — F41.1 GENERALIZED ANXIETY DISORDER: ICD-10-CM

## 2025-02-25 DIAGNOSIS — F33.3 SEVERE EPISODE OF RECURRENT MAJOR DEPRESSIVE DISORDER, WITH PSYCHOTIC FEATURES: Primary | ICD-10-CM

## 2025-02-25 DIAGNOSIS — F43.10 POST TRAUMATIC STRESS DISORDER (PTSD): ICD-10-CM

## 2025-02-25 NOTE — PROGRESS NOTES
[facility]      Follow Up Adult Note     Date:2025   Client Name: Eric Lay  : 1975   MRN: [id number]   Time IN: 11:05 AM    Time OUT: 12:00 PM     Referring Provider: Jeffery Etienne MD    Chief Complaint:      ICD-10-CM ICD-9-CM   1. Severe episode of recurrent major depressive disorder, with psychotic features  F33.3 296.34   2. Generalized anxiety disorder  F41.1 300.02   3. Post traumatic stress disorder (PTSD)  F43.10 309.81        History of Present Illness:   Eric Lay is a 49 y.o. male who is being seen today for follow up individual psychotherapy counseling for depression, anxiety, and PTSD.        Objective     PHQ-9 Depression Screening  Little interest or pleasure in doing things? Almost all   Feeling down, depressed, or hopeless? Almost all   PHQ-2 Total Score 6   Trouble falling or staying asleep, or sleeping too much? Almost all   Feeling tired or having little energy? Almost all   Poor appetite or overeating? Almost all   Feeling bad about yourself - or that you are a failure or have let yourself or your family down? Almost all   Trouble concentrating on things, such as reading the newspaper or watching television? Almost all   Moving or speaking so slowly that other people could have noticed? Or the opposite - being so fidgety or restless that you have been moving around a lot more than usual? Almost all   Thoughts that you would be better off dead, or of hurting yourself in some way? Almost all   PHQ-9 Total Score 27   If you checked off any problems, how difficult have these problems made it for you to do your work, take care of things at home, or get along with other people? Extremely difficult        DILLON-7  Feeling nervous, anxious or on edge: Nearly every day  Not being able to stop or control worrying: Nearly every day  Worrying too much about different things: Nearly every day  Trouble Relaxing: Nearly every day  Being so restless  that it is hard to sit still: Nearly every day  Feeling afraid as if something awful might happen: Nearly every day  Becoming easily annoyed or irritable: Nearly every day  DILLON 7 Total Score: 21  If you checked any problems, how difficult have these problems made it for you to do your work, take care of things at home, or get along with other people: Extremely difficult      Medications:     Current Outpatient Medications:     Brexpiprazole (Rexulti) 0.5 MG tablet, Take 0.5 mg by mouth Daily., Disp: 30 tablet, Rfl: 1    busPIRone (BUSPAR) 15 MG tablet, Take 1 tablet by mouth 2 (Two) Times a Day for 60 days., Disp: 60 tablet, Rfl: 1    FLUoxetine (PROzac) 20 MG capsule, Take 1 capsule by mouth Daily for 60 days. Day 1-3: Take Zoloft 25mg daily and Prozac 20mg daily. Day 4: Stop Zoloft and continue Prozac 20mg daily until next visit., Disp: 30 capsule, Rfl: 1    HYDROcodone-acetaminophen (NORCO) 7.5-325 MG per tablet, Take 1 tablet by mouth 3 times a day., Disp: , Rfl:     meloxicam (MOBIC) 15 MG tablet, Take 1 tablet by mouth Daily., Disp: 90 tablet, Rfl: 1    naloxone (NARCAN) 4 MG/0.1ML nasal spray, Administer 1 spray into the nostril(s) as directed by provider As Needed., Disp: , Rfl:     traZODone (DESYREL) 150 MG tablet, Take 1 tablet by mouth every night at bedtime., Disp: 90 tablet, Rfl: 1    zolpidem CR (AMBIEN CR) 12.5 MG CR tablet, Take 1 tablet by mouth every night at bedtime., Disp: 30 tablet, Rfl: 0    Allergies:   Allergies   Allergen Reactions    Bee Venom Hives    Cat Dander Itching    Dust Mite Extract Itching         Subjective     Mental Status Exam:   MENTAL STATUS EXAM   General Appearance:  Cleanly groomed and dressed  Eye Contact:  Good eye contact  Attitude:  Cooperative  Motor Activity:  Normal gait, posture  Muscle Strength:  Normal  Speech:  Normal rate, tone, volume  Language:  Stereotypical and unspontaneous  Mood and affect:  Depressed and mood congruent  Thought Process:  Logical and  "goal-directed  Associations/ Thought Content:  No delusions  Hallucinations:  None  Suicidal Ideations:  Not present  Homicidal Ideation:  Not present  Sensorium:  Alert and clear  Orientation:  Person, place, time and situation  Immediate Recall, Recent, and Remote Memory:  Intact  Attention Span/ Concentration:  Good  Fund of Knowledge:  Appropriate for age and educational level  Intellectual Functioning:  Average range  Insight:  Good  Judgement:  Good  Reliability:  Good  Impulse Control:  Good       Client's Support Network Includes:  wife    Functional Status: No impairment    Progress toward goal: Not at goal    Prognosis: Good with Ongoing Treatment     Assessment / Plan / Progress     Visit Diagnosis/Orders Placed This Visit:    ICD-10-CM ICD-9-CM   1. Severe episode of recurrent major depressive disorder, with psychotic features  F33.3 296.34   2. Generalized anxiety disorder  F41.1 300.02   3. Post traumatic stress disorder (PTSD)  F43.10 309.81        SUICIDE RISK ASSESSMENT/CSSRS:  1. Does client have thoughts of suicide? Patient denies  2. Does client have intent for suicide? Patient denies  3. Does client have a current plan for suicide? Patient denies   4. History of suicide attempts: Yes   5. Family history of suicide or attempts: Patient denies   6. History of violent behaviors towards others or property or thoughts of committing suicide: Yes   7. History of sexual aggression toward others: Patient denies   8. Access to firearms or weapons: Patient denies     PLAN:  Safety: No acute safety concerns  Risk Assessment: Risk of self-harm acutely is low. Risk of self-harm chronically is also low, but could be further elevated in the event of treatment noncompliance and/or AODA.    Presenting Problem: Client stated \"I'm here\" when clinician asked how he is feeling. He reported that he hates leaving the house because his house is safe. He reported that he doesn't have friends because people always let him " down. He declined clinician's suggestion of joining a support group. He discussed how he used to enjoy playing a card game called Magic, however, he stopped playing it because other people complained about how he would play. He reported that he doesn't plan to ever play again. He reported that he now plays [place] [employer] with his son.     Treatment Plan/Goals & Progress: Continue supportive psychotherapy efforts and medications as indicated. Treatment options discussed during today's visit. Client ackowledged and verbally consented to continue with current treatment plan and was educated on the importance of compliance with treatment and follow-up appointments. Client seems reasonably able to adhere to treatment plan.      Assisted client in processing above session content; acknowledged and normalized client’s thoughts, feelings, and concerns.  Rationalized client's thought process regarding depression, anxiety, and PTSD.      Allowed client to freely discuss issues without interruption or judgement with unconditional positive regard, active listening skills, and empathy. Clinician provided a safe, confidential environment to facilitate the development of a positive therapeutic relationship and encouraged open, honest communication. Assisted client in identifying risk factors which would indicate the need for higher level of care including thoughts to harm self or others and/or self-harming behavior and encouraged client to contact this office, call 911, or present to the nearest emergency room should any of these events occur. Discussed crisis intervention services and means to access. Client adamantly and convincingly denies current suicidal or homicidal ideation or perceptual disturbance. Assisted client in processing session content; acknowledged and normalized client’s thoughts, feelings, and concerns by utilizing a person-centered approach in efforts to build appropriate rapport and a positive therapeutic  relationship with open and honest communication.       Follow Up:   Return in about 3 weeks (around 3/18/2025) for Next scheduled follow up.    Juli Lancaster LCSW  [facility]

## 2025-02-26 ENCOUNTER — OFFICE VISIT (OUTPATIENT)
Dept: FAMILY MEDICINE CLINIC | Facility: CLINIC | Age: 50
End: 2025-02-26
Payer: MEDICAID

## 2025-02-26 VITALS
DIASTOLIC BLOOD PRESSURE: 80 MMHG | TEMPERATURE: 97.7 F | BODY MASS INDEX: 27.72 KG/M2 | HEIGHT: 74 IN | SYSTOLIC BLOOD PRESSURE: 122 MMHG | HEART RATE: 80 BPM | OXYGEN SATURATION: 99 % | WEIGHT: 216 LBS

## 2025-02-26 DIAGNOSIS — M51.362 DEGENERATION OF INTERVERTEBRAL DISC OF LUMBAR REGION WITH DISCOGENIC BACK PAIN AND LOWER EXTREMITY PAIN: ICD-10-CM

## 2025-02-26 DIAGNOSIS — M50.30 DEGENERATIVE DISC DISEASE, CERVICAL: ICD-10-CM

## 2025-02-26 DIAGNOSIS — N50.812 LEFT TESTICULAR PAIN: Primary | ICD-10-CM

## 2025-02-26 DIAGNOSIS — G89.29 CHRONIC LEFT SHOULDER PAIN: ICD-10-CM

## 2025-02-26 DIAGNOSIS — M25.512 CHRONIC LEFT SHOULDER PAIN: ICD-10-CM

## 2025-02-26 DIAGNOSIS — R26.9 GAIT ABNORMALITY: ICD-10-CM

## 2025-02-26 PROCEDURE — 99214 OFFICE O/P EST MOD 30 MIN: CPT | Performed by: FAMILY MEDICINE

## 2025-02-26 PROCEDURE — 1125F AMNT PAIN NOTED PAIN PRSNT: CPT | Performed by: FAMILY MEDICINE

## 2025-02-26 NOTE — PLAN OF CARE
Short term treatment goals include continuing to develop rapport with clinician and following treatment recommendations. Long term treatment goals include processing emotions, stressors, and trauma in a safe environment, reducing levels of depression, anxiety, and PTSD, and developing appropriate coping skills.

## 2025-02-26 NOTE — PROGRESS NOTES
Subjective   Eric Lay is a 49 y.o. male who is here for   Chief Complaint   Patient presents with    Shoulder Pain     Left - seeing PT tomorrow    .     History of Present Illness   History of Present Illness  Eric Lay presents to the office for multiple medical problems.  Patient has longstanding history of left shoulder pain.  Patient is scheduled for physical therapy.  He has been using a cane for mobility, which has resulted in left shoulder pain. He has not received a cortisone injection in approximately 15 years. He has a history of rotator cuff tear, which was not surgically repaired, and has sustained multiple shoulder injuries over the years. He has not undergone physical therapy.  He was unable to keep physical therapy appointment prior to tomorrow due to snow.  X-ray revealed no acute bony abnormality.    Patient also complains of intermittent scrotum pain.  Patient states this has been present for the past 10 years but he has declined workup prior due to his father and grandfather having complications following hernia surgery.  Patient states he has increased pain with lifting or straining.    Patient also presents for face-to-face encounter for wheelchair.  Patient states he has been having difficulty with getting transportation and being involved with the family due to the fact that he cannot walk very far even with a cane.  Review of Systems   Constitutional:  Negative for activity change and appetite change.   Respiratory:  Negative for cough and shortness of breath.    Cardiovascular:  Negative for chest pain and leg swelling.   Musculoskeletal:  Positive for arthralgias, back pain and neck pain.   Skin:  Negative for color change and rash.   Neurological:  Positive for weakness.   Psychiatric/Behavioral:  Positive for dysphoric mood and sleep disturbance. The patient is nervous/anxious.        Objective   Vitals:    02/26/25 0855   BP: 122/80   BP Location: Left arm   Patient  "Position: Sitting   Cuff Size: Adult   Pulse: 80   Temp: 97.7 °F (36.5 °C)   SpO2: 99%   Weight: 98 kg (216 lb)   Height: 188 cm (74.02\")      Physical Exam  Vitals and nursing note reviewed.   Constitutional:       Appearance: Normal appearance. He is normal weight.   HENT:      Head: Normocephalic and atraumatic.   Cardiovascular:      Rate and Rhythm: Normal rate and regular rhythm.      Pulses: Normal pulses.      Heart sounds: No murmur heard.  Pulmonary:      Effort: Pulmonary effort is normal. No respiratory distress.      Breath sounds: Normal breath sounds. No wheezing.   Musculoskeletal:      Left shoulder: Tenderness and bony tenderness present. No swelling or deformity. Decreased range of motion. Normal pulse.      Comments:  +impingement on left, unable to abduct arm from left side.decreased internal and external rotation from pain.   Skin:     General: Skin is warm and dry.   Neurological:      General: No focal deficit present.      Mental Status: He is alert.      Motor: Weakness present.      Gait: Gait abnormal.   Psychiatric:         Mood and Affect: Mood normal.         Thought Content: Thought content normal.       Physical Exam        Assessment & Plan   Assessment & Plan    Diagnoses and all orders for this visit:    1. Left testicular pain (Primary)  Unclear etiology.  Will obtain ultrasound of scrotum and testicles for evaluation.  Concern for possible inguinal hernia.  Patient states he is very scared if it is a hernia due to his dad and grandfather having complications postsurgery.  -     US Scrotum & Testicles; Future    2. Degeneration of intervertebral disc of lumbar region with discogenic back pain and lower extremity pain  We will order lightweight wheelchair so patient can be assisted for transportation  -     Lightweight Wheelchair    3. Degenerative disc disease, cervical  We will order lightweight wheelchair so patient can be assisted for transportation  -     Lightweight " Wheelchair    4. Gait abnormality  We will order lightweight wheelchair so patient can be assisted for transportation  -     Lightweight Wheelchair    5. Chronic left shoulder pain  Patient is to complete physical therapy.  Consider obtaining MRI after physical therapy to further evaluate for possible rotator cuff injury.    Results      There are no Patient Instructions on file for this visit.    There are no discontinued medications.     Return in about 4 weeks (around 3/26/2025) for Recheck.           Jeffery Etienne MD  [place], [place]

## 2025-02-27 ENCOUNTER — HOSPITAL ENCOUNTER (OUTPATIENT)
Dept: PHYSICAL THERAPY | Facility: HOSPITAL | Age: 50
Setting detail: THERAPIES SERIES
Discharge: HOME | End: 2025-02-27
Payer: MEDICAID

## 2025-02-27 DIAGNOSIS — Z74.09 IMPAIRED MOBILITY: ICD-10-CM

## 2025-02-27 DIAGNOSIS — F51.01 PRIMARY INSOMNIA: ICD-10-CM

## 2025-02-27 DIAGNOSIS — M25.612 DECREASED RANGE OF MOTION OF LEFT SHOULDER: ICD-10-CM

## 2025-02-27 DIAGNOSIS — M25.512 CHRONIC LEFT SHOULDER PAIN: Primary | ICD-10-CM

## 2025-02-27 DIAGNOSIS — G89.29 CHRONIC LEFT SHOULDER PAIN: Primary | ICD-10-CM

## 2025-02-27 PROCEDURE — 97161 PT EVAL LOW COMPLEX 20 MIN: CPT

## 2025-02-27 RX ORDER — ZOLPIDEM TARTRATE 12.5 MG/1
12.5 TABLET, FILM COATED, EXTENDED RELEASE ORAL
Qty: 30 TABLET | Refills: 0 | Status: CANCELLED | OUTPATIENT
Start: 2025-02-27

## 2025-02-27 NOTE — THERAPY EVALUATION
Outpatient Physical Therapy Ortho Initial Evaluation  [facility]     Patient Name: Eric Lay  : 1975  MRN: [id number]  Today's Date: 2025      Visit Date: 2025    Patient Active Problem List   Diagnosis    Leukocytosis    Nicotine dependence    Severe episode of recurrent major depressive disorder, with psychotic features    Generalized anxiety disorder    Post traumatic stress disorder (PTSD)        Past Medical History:   Diagnosis Date    ADHD (attention deficit hyperactivity disorder)     Anxiety     Arthritis     Asthma     Depression     Headache     Hyperlipidemia     Hypertension     Liver disease     Fatty liver    Low back pain     Seizures     Type 2 diabetes mellitus         Past Surgical History:   Procedure Laterality Date    SPINE SURGERY      Osteoblastoma benign removed       Visit Dx:     ICD-10-CM ICD-9-CM   1. Chronic left shoulder pain  M25.512 719.41    G89.29 338.29   2. Decreased range of motion of left shoulder  M25.612 719.51   3. Impaired mobility  Z74.09 799.89          Patient History       Row Name 25 1100 25 1039          History    Chief Complaint Difficulty Walking;Difficulty with daily activities;Dizziness;Falls/history of falls;Fatigue/poor endurance;Joint stiffness;Muscle tenderness;Muscle weakness;Numbness;Pain;Problems breathing/shortness of breath;Tinglings  -MO Difficulty Walking;Difficulty with daily activities;Dizziness;Falls/history of falls;Fatigue/poor endurance;Joint stiffness;Muscle tenderness;Muscle weakness;Numbness;Pain;Problems breathing/shortness of breath;Tinglings (P)    -patient     Type of Pain Shoulder pain  -MO Ankle pain;Back pain;Chest pain;Elbow pain;Foot pain;Hip pain;Knee pain;Lower Extremity / Leg;Neck pain;Shoulder pain;Upper Extremity / Arm (P)    -patient     Date Current Problem(s) Began 24  -MO 24 (P)    -patient     Brief Description of Current Complaint 48 y/o male reports to PT w/ complaints  "of worsening L shoulder pain over the last several months. Extensive hx reported by patient includes 3 back surgeries, in the progress \"blew out the nerve to L leg. I know I have neuropathy\", as well as reports bone spurs in BL hips. Has been utilizing a cane in the L arm since may of 2024 secondary to LLE impairment with pt reporting reduced strength and feeling like it will give out all the time as well as secondary to significant pain. When he uses the cane, he puts near full weight through his LUE. L shoulder pain started in December which he feels is likely related to the cane use as there is no other known VANDANA. Current shoulder pain is aggravated with any overhead lifting, can't hardly move it, feels like \"I am lifting 20lbs\", forearm rotation irritates the shoulder, always hurting when walking and using the cane.  PCP has put in an order for a transport w/c. Additionally has pain that starts at the base of his left skull and radiates down into his back from time to time. He does report he has had cortisone shots in the past that helped. He goes to pain management, is instructed to take hydrocodone 3x per day but only takes them when needed which may only be 2-3 times per week, as he does not want to overuse. 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 Additionally he reports he has absentee seizures, anywhere from 2-10 per day. Does not feel them coming on, will just zone out and then come back to. He has been off work since May 2024. Additionally reports frequent falls, however has no had a fall in the last 2 months.  -MO Shoulder pain (P)    -patient     Patient/Caregiver Goals Relieve pain;Return to prior level of function;Improve mobility  -MO Relieve pain;Return to prior level of function;Improve mobility (P)    -patient     Hand Dominance right-handed  -MO right-handed (P)    -patient     Occupation/sports/leisure activities -- Disabled (P)    -patient     Patient seeing anyone else for problem(s)? -- No " (P)    -patient     What clinical tests have you had for this problem? -- X-ray (P)    -patient        Pain     Pain Location Shoulder  -MO --     Pain at Present 3  -MO --     Pain at Best 1  -MO --     Pain at Worst 10  -MO --        Fall Risk Assessment    Any falls in the past year: Yes  -MO Yes (P)    -patient     Factors that contributed to the fall: -- Lost balance (P)    -patient        Services    Prior Rehab/Home Health Experiences -- Yes (P)    -patient     Are you currently receiving Home Health services -- No (P)    -patient     Do you plan to receive Home Health services in the near future -- Yes (P)    -patient        Daily Activities    Primary Language English  -MO English (P)    -patient     Are you able to read Yes  -MO Yes (P)    -patient     Are you able to write Yes  -MO Yes (P)    -patient     How does patient learn best? Listening;Pictures/Video  -MO Listening;Pictures/Video (P)    -patient     Pt Participated in POC and Goals Yes  -MO --        Safety    Are you being hurt, hit, or frightened by anyone at home or in your life? No  -MO No (P)    -patient     Are you being neglected by a caregiver No  -MO No (P)    -patient     Have you had any of the following issues with Depression;Anxiety;Panic Attacks;Neglect/Abuse  -MO Depression;Anxiety;Panic Attacks;Neglect/Abuse (P)    -patient               User Key  (r) = Recorded By, (t) = Taken By, (c) = Cosigned By      Initials Name Provider Type    Fe Singer, PT Physical Therapist    patient Eric J Alireza --                     PT Ortho       Row Name 02/27/25 1100       Special Tests/Palpation    Special Tests/Palpation Shoulder  -MO       Shoulder Girdle Palpation    Shoulder Girdle Palpation? Yes  -MO       General ROM    RT Upper Ext Rt Shoulder ABduction;Rt Shoulder Flexion;Rt Shoulder External Rotation;Rt Shoulder Internal Rotation  -MO    LT Upper Ext Lt Shoulder ABduction;Lt Shoulder Flexion;Lt Shoulder External Rotation;Lt  Shoulder Internal Rotation  -MO       Right Upper Ext    Rt Shoulder Abduction AROM 178  -MO    Rt Shoulder Flexion AROM 182  -MO    Rt Shoulder External Rotation AROM WFL  -MO    Rt Shoulder Internal Rotation AROM WFL  -MO       Left Upper Ext    Lt Shoulder Abduction AROM 53 standing  -MO    Lt Shoulder Abduction PROM 92 supine  -MO    Lt Shoulder Flexion AROM seated 92, supine 62  w/ high pain  -MO    Lt Shoulder Flexion PROM 161  no endfeel, held secondary to pain  -MO    Lt Shoulder External Rotation PROM 56 supine  in 45deg scap  -MO    Lt Shoulder Internal Rotation PROM 60 supine  in 45deg scap  -MO       MMT (Manual Muscle Testing)    Rt Upper Ext Rt Shoulder Flexion;Rt Shoulder ABduction;Rt Shoulder Internal Rotation;Rt Shoulder External Rotation;Rt Elbow Flexion;Rt Elbow Extension  -MO    Lt Upper Ext Lt Shoulder Flexion;Lt Shoulder ABduction;Lt Shoulder Internal Rotation;Lt Shoulder External Rotation;Lt Elbow Extension;Lt Elbow Flexion  -MO       MMT Right Upper Ext    Rt Shoulder Flexion MMT, Gross Movement (5/5) normal  -MO    Rt Shoulder ABduction MMT, Gross Movement (4+/5) good plus  w/ pain  -MO    Rt Shoulder Internal Rotation MMT, Gross Movement (5/5) normal  tested in neutral and in 90/90 seated  -MO    Rt Shoulder External Rotation MMT, Gross Movement (5/5) normal  tested in neutral and in 90/90 seated  -MO    Rt Elbow Flexion MMT, Gross Movement: (5/5) normal  -MO    Rt Elbow Extension MMT, Gross Movement: (5/5) normal  -MO       MMT Left Upper Ext    Lt Shoulder Flexion MMT, Gross Movement (3+/5) fair plus  not able to achieve 90deg flexion for testing. Testing in ~50deg flex in supine. 5/5 strength when tested in neutral as isometric  -MO    Lt Shoulder ABduction MMT, Gross Movement (4/5) good  -MO    Lt Shoulder Internal Rotation MMT, Gross Movement (4/5) good  tested in neutral- limited by pain  -MO    Lt Shoulder External Rotation MMT, Gross Movement (4/5) good  tested in neutral- limited  by pain  -MO    Lt Elbow Flexion MMT, Gross Movement (5/5) normal  -MO    Lt Elbow Extension MMT, Gross Movement (4-/5) good minus  onset of shoulder pain  -MO        Strength Right    # Reps 3  -MO    Right Rung 4  -MO    Right  Test 1 110  -MO    Right  Test 2 120  -MO    Right  Test 3 114  -MO     Strength Average Right 114.67  -MO        Strength Left    # Reps 3  -MO    Left Rung 4  -MO    Left  Test 1 104  -MO    Left  Test 2 110  -MO    Left  Test 3 105  -MO     Strength Average Left 106.33  -MO       Gait/Stairs (Locomotion)    Assistive Device (Gait) cane, straight  -MO    Pattern (Gait) 2-point  -MO    Deviations/Abnormal Patterns (Gait) antalgic;steppage;left sided deviations  -MO    Left Sided Gait Deviations leans left;forward flexed posture;hip circumduction  -MO    Comment, (Gait/Stairs) LUE staight arm, ambulating with SPC in L hand, utilizing 2 point gait pattern, near full weight through LUE  -MO       Hand  Strength     Strength Affected Side Bilateral  -MO              User Key  (r) = Recorded By, (t) = Taken By, (c) = Cosigned By      Initials Name Provider Type    Fe Singer, PT Physical Therapist                                Therapy Education  Education Details: Educated on PT role and POC; discussed evaluation findings. Discussed adding ice to daily routine for imflammation  Given: HEP, Symptoms/condition management  Program: New  How Provided: Verbal, Demonstration, Written  Provided to: Patient  Level of Understanding: Teach back education performed, Verbalized, Demonstrated      PT OP Goals       Row Name 02/27/25 1200          PT Short Term Goals    STG Date to Achieve 03/29/25  -MO     STG 1 Pt will be independent and verbalized good understanding of initial HEP to improve ability to manage pain, as well as improve strength and ROM.  -MO     STG 1 Progress New  -MO     STG 2 Pt will improve supine L shoulder flexion to >/= 120 deg  for improved ability to complete daily ADLs.  -MO     STG 2 Progress New  -MO     STG 3 Pt will demo improved gait mechanics, ambulating with SPC in RUE, utilizing appropriate 2 point gait mechanics safely, to reduce strain throughout LUE.  -MO     STG 3 Progress New  -MO        Long Term Goals    LTG Date to Achieve 04/28/25  -MO     LTG 1 Pt will be independent and verbalized good understanding of advanced HEP to improve ability to manage pain, as well as improve strength and ROM.  -MO     LTG 1 Progress New  -MO     LTG 2 Pt will report </= 3/10 pain in L shoulder with overhead activities to improve participation in ADLs.  -MO     LTG 2 Progress New  -MO     LTG 3 Pt will improve L shoulder gross strength to at least 4+/5.  -MO     LTG 3 Progress New  -MO     LTG 4 Pt will improve active L shoulder flexion to >/= 150 deg for improved ease of ADL completion  -MO     LTG 4 Progress New  -MO     LTG 5 Pt will improve DASH score to </= 45% perceived disability to show overall improved quality of life.  -MO     LTG 5 Progress New  -MO     LTG 6 Pt will improve L shoulder function ER to >/= base of skull for improve ease of ADL completion.  -MO     LTG 6 Progress New  -MO        Time Calculation    PT Goal Re-Cert Due Date 02/28/25  -MO               User Key  (r) = Recorded By, (t) = Taken By, (c) = Cosigned By      Initials Name Provider Type    Fe Singer, PT Physical Therapist                     PT Assessment/Plan       Row Name 02/27/25 1200          PT Assessment    Functional Limitations Decreased safety during functional activities;Impaired locomotion;Impaired gait;Performance in self-care ADL;Performance in work activities;Performance in leisure activities;Limitations in functional capacity and performance;Limitations in community activities  -MO     Impairments Balance;Pain;Gait;Range of motion;Poor body mechanics;Muscle strength;Motor function;Impaired flexibility;Posture  -MO     Assessment Comments  Eric Lay is a 49 y.o. male referred to physical therapy for evaluation of L shoulder pain. He presents with a stable clinical presentation. Pt reports hx of 3 back surgeries that include lumbar fusion, additional hx includes PTSD, recurrent major depressive disorder with psychosis, and generalized anxiety, as well as overall chronicity of symptoms with significant mobility impairments at this time that may impact his progress in the plan of care. Pt presents today with overall high pain irritability throughout session. No loss of  strength noted bilaterally. He does demo significant active L shoulder ROM deficits in all direction in standing and supine with pain, full passive shoulder flexion in supine, limited passive abduction secondary to pain. All L shoulder strength normal in neutral testing positions, grossly 3+ -4/5 when outside of neutral however likely limited secondary to pain. Unable to achieve any special testing positions at this time. His signs and symptoms do not appear to be secondary to any full or severe RTC tearing however can not rull out any RTC tendonitis or labral involvement. Will continue to assess over the next several sessions and assess for changes. The previous impairments limit his ability to complete all daily ADLs, sleep without disruption, and participate in any other recreational activities. Patients self reported QuickDASH outcome measure is 81%, ranging form 0-100 with 100% being severe disability. Pt will benefit from skilled PT to address the previous impairments and return to PLOF.  -MO     Please refer to paper survey for additional self-reported information No  -MO     Rehab Potential Fair  -MO     Patient/caregiver participated in establishment of treatment plan and goals Yes  -MO     Patient would benefit from skilled therapy intervention Yes  -MO        PT Plan    PT Frequency 2x/week;1x/week  -MO     Predicted Duration of Therapy Intervention (PT) 10 visits   -MO     Planned CPT's? PT EVAL LOW COMPLEXITY: 66225;PT RE-EVAL: 60597;PT THER PROC EA 15 MIN: 06063;PT THER ACT EA 15 MIN: 31781;PT MANUAL THERAPY EA 15 MIN: 43871;PT NEUROMUSC RE-EDUCATION EA 15 MIN: 27312;PT GAIT TRAINING EA 15 MIN: 46340;PT EVAL AQUA: 85741;PT AQUATIC THERAPY EA 15 MIN: 54745;PT SELF CARE/HOME MGMT/TRAIN EA 15: 96938;PT HOT OR COLD PACK TREAT MCARE;PT ELECTRICAL STIM UNATTEND:   -MO     PT Plan Comments pulleys for flexion, banister/table slides, SB roll out, seated scapular retraction, supine AAROM w/ dowel, low row, manual to L shoulder and UT. UT seated stretch  -MO               User Key  (r) = Recorded By, (t) = Taken By, (c) = Cosigned By      Initials Name Provider Type    Fe Singer, PT Physical Therapist                                        Outcome Measure Options: Quick DASH  Quick DASH  Open a tight or new jar.: Moderate Difficulty  Do heavy household chores (e.g., wash walls, wash floors): Severe Difficulty  Carry a shopping bag or briefcase: Severe Difficulty  Wash your back: Unable  Use a knife to cut food: Unable  Recreational activities in which you take some force or impact through your arm, should or hand (e.g. golf, hammering, tennis, etc.): Unable  During the past week, to what extent has your arm, shoulder, or hand problem interfered with your normal social activites with family, friends, neighbors or groups?: Extremely  During the past week, were you limited in your work or other regular daily activities as a result of your arm, shoulder or hand problem?: Very limited  Arm, Shoulder, or hand pain: Severe  Tingling (pins and needles) in your arm, shoulder, or hand: Moderate  During the past week, how much difficulty have you had sleeping because of the pain in your arm, shoulder or hand?: So much Difficulty that I can't sleep  Number of Questions Answered: 11  Quick DASH Score: 81.82         Time Calculation:     Start Time: 1115  Stop Time: 1200  Time  Calculation (min): 45 min  Untimed Charges  PT Eval/Re-eval Minutes: 45  Total Minutes  Untimed Charges Total Minutes: 45   Total Minutes: 45     Therapy Charges for Today       Code Description Service Date Service Provider Modifiers Qty    [id number] HC PT EVAL LOW COMPLEXITY 3 2/27/2025 Fe Maldonado, PT GP 1            PT G-Codes  Outcome Measure Options: Quick DASH  Quick DASH Score: 81.82         Fe Maldonado, PT  2/27/2025

## 2025-02-28 DIAGNOSIS — F51.01 PRIMARY INSOMNIA: ICD-10-CM

## 2025-02-28 RX ORDER — ZOLPIDEM TARTRATE 12.5 MG/1
12.5 TABLET, FILM COATED, EXTENDED RELEASE ORAL
Qty: 30 TABLET | Refills: 0 | Status: SHIPPED | OUTPATIENT
Start: 2025-02-28

## 2025-03-06 ENCOUNTER — HOSPITAL ENCOUNTER (OUTPATIENT)
Dept: PHYSICAL THERAPY | Facility: HOSPITAL | Age: 50
Setting detail: THERAPIES SERIES
Discharge: HOME | End: 2025-03-06
Payer: MEDICAID

## 2025-03-06 DIAGNOSIS — Z74.09 IMPAIRED MOBILITY: ICD-10-CM

## 2025-03-06 DIAGNOSIS — M25.612 DECREASED RANGE OF MOTION OF LEFT SHOULDER: ICD-10-CM

## 2025-03-06 DIAGNOSIS — M25.512 CHRONIC LEFT SHOULDER PAIN: Primary | ICD-10-CM

## 2025-03-06 DIAGNOSIS — G89.29 CHRONIC LEFT SHOULDER PAIN: Primary | ICD-10-CM

## 2025-03-06 PROCEDURE — 97110 THERAPEUTIC EXERCISES: CPT

## 2025-03-06 PROCEDURE — 97116 GAIT TRAINING THERAPY: CPT

## 2025-03-06 NOTE — THERAPY TREATMENT NOTE
Outpatient Physical Therapy Ortho Treatment Note  [facility]     Patient Name: Eric Lay  : 1975  MRN: [id number]  Today's Date: 3/6/2025      Visit Date: 2025    Visit Dx:    ICD-10-CM ICD-9-CM   1. Chronic left shoulder pain  M25.512 719.41    G89.29 338.29   2. Decreased range of motion of left shoulder  M25.612 719.51   3. Impaired mobility  Z74.09 799.89       Patient Active Problem List   Diagnosis    Leukocytosis    Nicotine dependence    Severe episode of recurrent major depressive disorder, with psychotic features    Generalized anxiety disorder    Post traumatic stress disorder (PTSD)        Past Medical History:   Diagnosis Date    ADHD (attention deficit hyperactivity disorder)     Anxiety     Arthritis     Asthma     Depression     Headache     Hyperlipidemia     Hypertension     Liver disease     Fatty liver    Low back pain     Seizures     Type 2 diabetes mellitus         Past Surgical History:   Procedure Laterality Date    SPINE SURGERY      Osteoblastoma benign removed                        PT Assessment/Plan       Row Name 25 0900          PT Assessment    Assessment Comments Mr. Lay returns to PT for his first f/u session since initial evaluation for L shoulder pain as well as significant overall mobility impairments. He continues to amb in with SPC in RUE locked in full extension from shoulder to hand, utilizing near full weightbearing into arm to offload RLE. Attempted to trial SPC in LUE to offload R shoulder however pt very unsteady and reports increased pain in L wrist. Trialed rwx with great improvement. Much improved stability, continues to have antalgic pattern with RLE circumduction and shortened stance time, significant ER. Discussed getting walker for full time mobility for safety and for R shoulder recovery, he is agreeable. Utilized clinic walker throughout session for safety. Initiated full program with emphasis on gentle range of motion with UBE  and pulley warm up as well as began initial shoulder girdle and dorsal scap strengthening all with great tolerance. He denies any onset of pain at termination, mild fatigue. Updated HEP with discussion on appropriate frequency of completion, he is agreeable. Will assess response to first session and progress as appropriate.  -MO        PT Plan    PT Plan Comments could trial nustep for UE and LE use. Did he get a walker? How were exercises? Supine SAP, RS in flex and scap, D2 flex if tolerable, s/l flex, abd, ER  -MO               User Key  (r) = Recorded By, (t) = Taken By, (c) = Cosigned By      Initials Name Provider Type    Fe Singer, PT Physical Therapist                       OP Exercises       Row Name 03/06/25 0800             Subjective    Subjective Comments No changes.  -MO         Total Minutes    24065 - Gait Training Minutes  10  -MO      83177 - PT Therapeutic Exercise Minutes 30  -MO         Exercise 1    Exercise Name 1 UBE/ pulleys  -MO      Reps 1 4 mins UBE  -MO      Time 1 2 mins pulley flex  -MO         Exercise 2    Exercise Name 2 gait training with AD  -MO      Time 2 attempted SPC in RUE to offload  -MO      Additional Comments attempted rwx  -MO         Exercise 3    Exercise Name 3 supine AAROM flex  -MO      Cueing 3 Verbal;Demo  -MO      Sets 3 1  -MO      Reps 3 15  -MO      Time 3 w/ cane  -MO         Exercise 4    Exercise Name 4 supine AAROM chest press  -MO      Cueing 4 Verbal;Demo  -MO      Sets 4 2  -MO      Reps 4 10  -MO         Exercise 5    Exercise Name 5 supine HA, B ER  -MO      Cueing 5 Verbal;Demo  -MO      Sets 5 2e  -MO      Reps 5 10  -MO      Time 5 GTB  -MO         Exercise 6    Exercise Name 6 rev shoulder rolls  -MO      Sets 6 1  -MO      Reps 6 20  -MO         Exercise 7    Exercise Name 7 seated scapular retraction  -MO      Cueing 7 Verbal;Demo  -MO      Sets 7 1  -MO      Reps 7 20  -MO      Time 7 3s  -MO         Exercise 8    Exercise Name 8 seated  UT/ shoulder glide  -MO      Cueing 8 Verbal;Demo  -MO      Sets 8 1  -MO      Reps 8 8  -MO      Time 8 5s  -MO         Exercise 9    Exercise Name 9 low row  -MO      Cueing 9 Verbal;Demo;Tactile  -MO      Sets 9 2  -MO      Reps 9 15  -MO      Time 9 RTB  -MO                User Key  (r) = Recorded By, (t) = Taken By, (c) = Cosigned By      Initials Name Provider Type    Fe Singer PT Physical Therapist                                                    Time Calculation:   Start Time: 0815  Stop Time: 0855  Time Calculation (min): 40 min  Timed Charges  40161 - PT Therapeutic Exercise Minutes: 30  99444 - Gait Training Minutes : 10  Total Minutes  Timed Charges Total Minutes: 40   Total Minutes: 40  Therapy Charges for Today       Code Description Service Date Service Provider Modifiers Qty    39297216602 HC PT THER PROC EA 15 MIN 3/6/2025 Fe Maldonado, PT GP 2    50961520600 HC GAIT TRAINING EA 15 MIN 3/6/2025 Fe Maldonado PT GP 1                      Fe Maldonado PT  3/6/2025

## 2025-03-10 ENCOUNTER — HOSPITAL ENCOUNTER (OUTPATIENT)
Dept: ULTRASOUND IMAGING | Facility: HOSPITAL | Age: 50
Discharge: HOME | End: 2025-03-10
Admitting: FAMILY MEDICINE
Payer: MEDICAID

## 2025-03-10 DIAGNOSIS — N50.812 LEFT TESTICULAR PAIN: ICD-10-CM

## 2025-03-10 PROCEDURE — 93976 VASCULAR STUDY: CPT

## 2025-03-10 PROCEDURE — 76870 US EXAM SCROTUM: CPT

## 2025-03-12 ENCOUNTER — HOSPITAL ENCOUNTER (OUTPATIENT)
Dept: PHYSICAL THERAPY | Facility: HOSPITAL | Age: 50
Setting detail: THERAPIES SERIES
Discharge: HOME | End: 2025-03-12
Payer: MEDICAID

## 2025-03-12 DIAGNOSIS — M25.512 CHRONIC LEFT SHOULDER PAIN: Primary | ICD-10-CM

## 2025-03-12 DIAGNOSIS — M25.612 DECREASED RANGE OF MOTION OF LEFT SHOULDER: ICD-10-CM

## 2025-03-12 DIAGNOSIS — Z74.09 IMPAIRED MOBILITY: ICD-10-CM

## 2025-03-12 DIAGNOSIS — G89.29 CHRONIC LEFT SHOULDER PAIN: Primary | ICD-10-CM

## 2025-03-12 PROCEDURE — 97110 THERAPEUTIC EXERCISES: CPT | Performed by: PHYSICAL THERAPIST

## 2025-03-12 PROCEDURE — 97140 MANUAL THERAPY 1/> REGIONS: CPT | Performed by: PHYSICAL THERAPIST

## 2025-03-12 NOTE — THERAPY TREATMENT NOTE
Outpatient Physical Therapy Ortho Treatment Note  [facility]     Patient Name: Eric Lay  : 1975  MRN: [id number]  Today's Date: 3/12/2025      Visit Date: 2025    Visit Dx:    ICD-10-CM ICD-9-CM   1. Chronic left shoulder pain  M25.512 719.41    G89.29 338.29   2. Decreased range of motion of left shoulder  M25.612 719.51   3. Impaired mobility  Z74.09 799.89       Patient Active Problem List   Diagnosis    Leukocytosis    Nicotine dependence    Severe episode of recurrent major depressive disorder, with psychotic features    Generalized anxiety disorder    Post traumatic stress disorder (PTSD)        Past Medical History:   Diagnosis Date    ADHD (attention deficit hyperactivity disorder)     Anxiety     Arthritis     Asthma     Depression     Headache     Hyperlipidemia     Hypertension     Liver disease     Fatty liver    Low back pain     Seizures     Type 2 diabetes mellitus         Past Surgical History:   Procedure Laterality Date    SPINE SURGERY      Osteoblastoma benign removed        PT Ortho       Row Name 25 0800       Left Upper Ext    Lt Shoulder Abduction AROM 140  seated, no painful arch  -GJ    Lt Shoulder Flexion AROM 155, seated, no painful arc  -GJ              User Key  (r) = Recorded By, (t) = Taken By, (c) = Cosigned By      Initials Name Provider Type    Will Ward, PT Physical Therapist                                 PT Assessment/Plan       Row Name 25 0841          PT Assessment    Assessment Comments Mr. Lay returns to the clinic today for is L shoulder pain. He demosntrates significant ER L>R LE creating difficulty with amblation, requiring use of cane. He relies heavily on UE support on is cane. He has now switched the use of cane from L to RUE, which he reports noted decrease in L shoulder pain since making the change. He denies being issued walker over the last 3 years, so I placed a request for his MD to order front wheeled walker to  faciltate opotimal safety with functional mobility. Considered warm up on Nustep to incorporate UE/LE, however he reports the Nustep would cause significant stress to his L hip, so we continued to warm up on UBE. Worked on scapular girdle strengthening activities in seated position (scap rows/shoulder ext with resistance bands). Added weight to reverse shoulder rolls. Added SL ER and manual RS for ER/IR and 100 scaption.  See ortho section for updated ROM. He demosntrates significant improvement in L shoulder AROM flexion and abd. No changes to HEP at this time, he is encouraged to use cane only in RUE at this time. He cotinues to be a good candidate for skilled physical therapy.  -GJ        PT Plan    PT Plan Comments check to see if the order for a walker was singed by MD, if so, print order for pt and instruct to obtain from pharmacy[facility]. warm up on UBE, pulleys, update HEP, consider adding SL fleixon, Sl abd  -GJ               User Key  (r) = Recorded By, (t) = Taken By, (c) = Cosigned By      Initials Name Provider Type    Will Ward, PT Physical Therapist                       OP Exercises       Row Name 03/12/25 0833 03/12/25 0800          Subjective    Subjective Comments -- my shoulder (L) is feeling better now that I'm not using it (was using cane in LUE, now in RUE)  -        Subjective Pain    Pre-Treatment Pain Level -- 3  -GJ     Subjective Pain Comment -- 0/10 at rest, L UE  -GJ        Total Minutes    01557 - PT Therapeutic Exercise Minutes 33  -GJ --     34356 - PT Manual Therapy Minutes 9  -GJ --        Exercise 1    Exercise Name 1 -- UBE/pulleys  -GJ     Reps 1 -- 4 mins UBE  -GJ     Time 1 -- 2 min for pulleys  -GJ        Exercise 3    Exercise Name 3 -- supine AAROM flex  -GJ     Cueing 3 -- Verbal;Demo  -GJ     Reps 3 -- 20  -GJ     Time 3 -- cane  -GJ        Exercise 4    Exercise Name 4 -- supine AAROM chest press  -GJ     Cueing 4 -- Verbal;Demo  -GJ     Sets 4 -- 2  -GJ      Reps 4 -- 10  -GJ        Exercise 5    Exercise Name 5 -- supine HA, B ER  -GJ     Cueing 5 -- Verbal;Demo  -GJ     Sets 5 -- 2e  -GJ     Reps 5 -- 10  -GJ     Time 5 -- RTB  -GJ        Exercise 6    Exercise Name 6 -- rev shoulder rolls, seated  -GJ     Reps 6 -- 20  -GJ     Time 6 -- 2#  -GJ        Exercise 7    Exercise Name 7 -- seated scapular retraction  -GJ     Cueing 7 -- Verbal;Demo  -GJ     Sets 7 -- 2  -GJ     Reps 7 -- 10  -GJ     Time 7 -- RTB  -GJ        Exercise 9    Exercise Name 9 -- shoulder ext, seated  -GJ     Cueing 9 -- Verbal;Demo;Tactile  -GJ     Sets 9 -- 2  -GJ     Reps 9 -- 10  -GJ     Time 9 -- RTB  -GJ        Exercise 10    Exercise Name 10 -- SL ER  -GJ     Cueing 10 -- Verbal;Demo  -GJ     Sets 10 -- 2  -GJ     Reps 10 -- 10  -GJ     Time 10 -- 1#  -GJ     Additional Comments -- towel under humerus  -GJ        Exercise 11    Exercise Name 11 -- supine serratus punch  -GJ     Cueing 11 -- Verbal;Demo  -GJ     Sets 11 -- 2  -GJ     Reps 11 -- 10  -GJ     Time 11 -- 2#  -GJ               User Key  (r) = Recorded By, (t) = Taken By, (c) = Cosigned By      Initials Name Provider Type    Will Ward, PT Physical Therapist                             Manual Rx (Last 36 Hours)       Manual Treatments       Row Name 03/12/25 0900 03/12/25 0833          Total Minutes    35846 - PT Manual Therapy Minutes -- 9  -GJ        Manual Rx 1    Manual Rx 1 Location RS for ER/IR, 110 scaption  -GJ --     Manual Rx 1 Type 3 x 45 s  -GJ --     Manual Rx 1 Grade L shouldrer  -GJ --               User Key  (r) = Recorded By, (t) = Taken By, (c) = Cosigned By      Initials Name Provider Type    Will Ward, PT Physical Therapist                     PT OP Goals       Row Name 03/12/25 0800          PT Short Term Goals    STG Date to Achieve 03/29/25  -GJ     STG 1 Pt will be independent and verbalized good understanding of initial HEP to improve ability to manage pain, as well as improve strength  and ROM.  -GJ     STG 1 Progress Ongoing  -GJ     STG 2 Pt will improve supine L shoulder flexion to >/= 120 deg for improved ability to complete daily ADLs.  -GJ     STG 2 Progress Met  -GJ     STG 3 Pt will demo improved gait mechanics, ambulating with SPC in RUE, utilizing appropriate 2 point gait mechanics safely, to reduce strain throughout LUE.  -GJ     STG 3 Progress Ongoing  -GJ     STG 3 Progress Comments cues required for gait pattern, very unsafe with cane in RUE, attempting to obtain order for walker  -        Long Term Goals    LTG Date to Achieve 04/28/25  -GJ     LTG 1 Pt will be independent and verbalized good understanding of advanced HEP to improve ability to manage pain, as well as improve strength and ROM.  -GJ     LTG 1 Progress Ongoing  -GJ     LTG 2 Pt will report </= 3/10 pain in L shoulder with overhead activities to improve participation in ADLs.  -GJ     LTG 2 Progress Ongoing  -GJ     LTG 3 Pt will improve L shoulder gross strength to at least 4+/5.  -GJ     LTG 3 Progress Ongoing  -GJ     LTG 4 Pt will improve active L shoulder flexion to >/= 150 deg for improved ease of ADL completion  -GJ     LTG 4 Progress Ongoing  -GJ     LTG 5 Pt will improve DASH score to </= 45% perceived disability to show overall improved quality of life.  -GJ     LTG 5 Progress Ongoing  -GJ     LTG 6 Pt will improve L shoulder function ER to >/= base of skull for improve ease of ADL completion.  -GJ     LTG 6 Progress Ongoing  -GJ               User Key  (r) = Recorded By, (t) = Taken By, (c) = Cosigned By      Initials Name Provider Type    Will Ward, PT Physical Therapist                    Therapy Education  Education Details: reviewed activity modifications, no chanages to HEP, continue to use cane in R hand  Given: HEP, Symptoms/condition management, Pain management, Posture/body mechanics, Fall prevention and home safety, Mobility training  Program: Reinforced, New  How Provided: Verbal,  Demonstration  Provided to: Patient  Level of Understanding: Teach back education performed, Verbalized, Demonstrated              Time Calculation:   Start Time: 0833  Stop Time: 0915  Time Calculation (min): 42 min  Timed Charges  41317 - PT Therapeutic Exercise Minutes: 33  94836 - PT Manual Therapy Minutes: 9  Total Minutes  Timed Charges Total Minutes: 42   Total Minutes: 42  Therapy Charges for Today       Code Description Service Date Service Provider Modifiers Qty    02927359402  PT THER PROC EA 15 MIN 3/12/2025 Will Gentile, PT GP 2    [id number]  PT MANUAL THERAPY EA 15 MIN 3/12/2025 Will Gentile, PT GP 1                      Will Gentile, PT  3/12/2025

## 2025-03-19 ENCOUNTER — HOSPITAL ENCOUNTER (OUTPATIENT)
Dept: PHYSICAL THERAPY | Facility: HOSPITAL | Age: 50
Setting detail: THERAPIES SERIES
Discharge: HOME | End: 2025-03-19
Payer: MEDICAID

## 2025-03-19 DIAGNOSIS — M25.612 DECREASED RANGE OF MOTION OF LEFT SHOULDER: ICD-10-CM

## 2025-03-19 DIAGNOSIS — G89.29 CHRONIC LEFT SHOULDER PAIN: Primary | ICD-10-CM

## 2025-03-19 DIAGNOSIS — Z74.09 IMPAIRED MOBILITY: ICD-10-CM

## 2025-03-19 DIAGNOSIS — M25.512 CHRONIC LEFT SHOULDER PAIN: Primary | ICD-10-CM

## 2025-03-19 PROCEDURE — 97110 THERAPEUTIC EXERCISES: CPT | Performed by: PHYSICAL THERAPIST

## 2025-03-19 PROCEDURE — 97140 MANUAL THERAPY 1/> REGIONS: CPT | Performed by: PHYSICAL THERAPIST

## 2025-03-19 NOTE — THERAPY TREATMENT NOTE
Outpatient Physical Therapy Ortho Treatment Note  [facility]     Patient Name: Eric Lay  : 1975  MRN: [id number]  Today's Date: 3/19/2025      Visit Date: 2025    Visit Dx:    ICD-10-CM ICD-9-CM   1. Chronic left shoulder pain  M25.512 719.41    G89.29 338.29   2. Decreased range of motion of left shoulder  M25.612 719.51   3. Impaired mobility  Z74.09 799.89       Patient Active Problem List   Diagnosis    Leukocytosis    Nicotine dependence    Severe episode of recurrent major depressive disorder, with psychotic features    Generalized anxiety disorder    Post traumatic stress disorder (PTSD)        Past Medical History:   Diagnosis Date    ADHD (attention deficit hyperactivity disorder)     Anxiety     Arthritis     Asthma     Depression     Headache     Hyperlipidemia     Hypertension     Liver disease     Fatty liver    Low back pain     Seizures     Type 2 diabetes mellitus         Past Surgical History:   Procedure Laterality Date    SPINE SURGERY      Osteoblastoma benign removed        PT Ortho       Row Name 25 0800       Left Upper Ext    Lt Shoulder Abduction AROM 160 seated  -GJ    Lt Shoulder Flexion AROM 165 seated  -GJ              User Key  (r) = Recorded By, (t) = Taken By, (c) = Cosigned By      Initials Name Provider Type    Will Ward, PT Physical Therapist                                 PT Assessment/Plan       Row Name 25 0839          PT Assessment    Assessment Comments Mr. Lay returns to the clinic for his L shoulder pain. He reports decreasing L shoulder pain, especially since transitioning use of cane from LUE to RUE. Issued signed order from MD for walker and encourage him to otain walker from a medical equipment provider. Continued with warm up on UBE/pulleys. Continuee with scap retraction/shoulder extesions in seated position secondary standing balance issues.  Added SL flexion and abduction to in clinic exercise program.  Updated HEP  with several exercies, with strengthening being once every other day. See ortho section for updated ROM.  He demonstrates continued improvement of his L shoulder AROM with noted absence of painful arc.  He has 2 additonal sessions of PT established. If he continues to progress at current rate, may consider transition to an independent program. He continues to e a good candidate for skilled physical therapy.  -GJ        PT Plan    PT Plan Comments assess response to obtaining walker and to progression of HEP.. may consider supine shoulder flexion with tband around wrists. may consider transition to independent program after 4/1/ appt  -GJ               User Key  (r) = Recorded By, (t) = Taken By, (c) = Cosigned By      Initials Name Provider Type     Will Gentile, PT Physical Therapist                       OP Exercises       Row Name 03/19/25 0830 03/19/25 0800          Subjective    Subjective Comments -- getting the cane out of my L hand has helped a lot.  My shoulder is feeling better  -GJ        Subjective Pain    Pre-Treatment Pain Level -- 1  -GJ        Total Minutes    70133 - PT Therapeutic Exercise Minutes 32  -GJ --     93869 - PT Manual Therapy Minutes 9  -GJ --        Exercise 1    Exercise Name 1 -- UBE/pulleys  -GJ     Reps 1 -- 4 mins UBE  -GJ     Time 1 -- 2 min for pulleys  -GJ        Exercise 2    Exercise Name 2 -- biceps curls  -GJ     Cueing 2 -- Verbal;Demo  -GJ     Reps 2 -- 20  -GJ     Time 2 -- 5#  -GJ     Additional Comments -- seated  -GJ        Exercise 5    Exercise Name 5 -- supine HA, B ER  -GJ     Cueing 5 -- Verbal;Demo  -GJ     Sets 5 -- 2e  -GJ     Reps 5 -- 10  -GJ     Time 5 -- RTB  -GJ        Exercise 6    Exercise Name 6 -- seated shoulder shrugs  -GJ     Cueing 6 -- Verbal;Demo  -GJ     Reps 6 -- 20  -GJ     Time 6 -- 5#  -GJ        Exercise 7    Exercise Name 7 -- seated scapular retraction  -GJ     Cueing 7 -- Verbal;Demo  -GJ     Sets 7 -- 2  -GJ     Reps 7 -- 10  -GJ      Time 7 -- RTB  -GJ        Exercise 9    Exercise Name 9 -- shoulder ext, seated  -GJ     Cueing 9 -- Verbal;Demo;Tactile  -GJ     Sets 9 -- 2  -GJ     Reps 9 -- 10  -GJ     Time 9 -- RTB  -GJ        Exercise 10    Exercise Name 10 -- SL ER  -GJ     Cueing 10 -- Verbal;Demo  -GJ     Sets 10 -- 2  -GJ     Reps 10 -- 10  -GJ     Time 10 -- 1#  -GJ        Exercise 11    Exercise Name 11 -- supine serratus punch  -GJ     Cueing 11 -- Verbal;Demo  -GJ     Sets 11 -- 2  -GJ     Reps 11 -- 10  -GJ     Time 11 -- 5#  -GJ        Exercise 12    Exercise Name 12 -- SL flexion  -GJ     Cueing 12 -- Verbal;Demo;Tactile  -GJ     Reps 12 -- 2  -GJ     Time 12 -- 10  -GJ     Additional Comments -- 0#  -GJ        Exercise 13    Exercise Name 13 -- SL abd  -GJ     Cueing 13 -- Verbal;Demo  -GJ     Sets 13 -- 2  -GJ     Reps 13 -- 10  -GJ     Time 13 -- 0#  -GJ               User Key  (r) = Recorded By, (t) = Taken By, (c) = Cosigned By      Initials Name Provider Type    Will Ward, PT Physical Therapist                             Manual Rx (Last 36 Hours)       Manual Treatments       Row Name 03/19/25 0830             Total Minutes    30146 - PT Manual Therapy Minutes 9  -GJ         Manual Rx 1    Manual Rx 1 Location RS for ER/IR, 110 scaption  -GJ      Manual Rx 1 Type 3 x 45 s  -GJ      Manual Rx 1 Grade L shouldrer  -GJ                User Key  (r) = Recorded By, (t) = Taken By, (c) = Cosigned By      Initials Name Provider Type    Will Ward, PT Physical Therapist                     PT OP Goals       Row Name 03/19/25 0800          PT Short Term Goals    STG Date to Achieve 03/29/25  -GJ     STG 1 Pt will be independent and verbalized good understanding of initial HEP to improve ability to manage pain, as well as improve strength and ROM.  -GJ     STG 1 Progress Ongoing  -GJ     STG 2 Pt will improve supine L shoulder flexion to >/= 120 deg for improved ability to complete daily ADLs.  -GJ     STG 2  Progress Met  -GJ     STG 3 Pt will demo improved gait mechanics, ambulating with SPC in RUE, utilizing appropriate 2 point gait mechanics safely, to reduce strain throughout LUE.  -GJ     STG 3 Progress Met  -GJ        Long Term Goals    LTG Date to Achieve 04/28/25  -GJ     LTG 1 Pt will be independent and verbalized good understanding of advanced HEP to improve ability to manage pain, as well as improve strength and ROM.  -GJ     LTG 1 Progress Ongoing  -GJ     LTG 2 Pt will report </= 3/10 pain in L shoulder with overhead activities to improve participation in ADLs.  -GJ     LTG 2 Progress Ongoing  -GJ     LTG 3 Pt will improve L shoulder gross strength to at least 4+/5.  -GJ     LTG 3 Progress Ongoing  -GJ     LTG 4 Pt will improve active L shoulder flexion to >/= 150 deg for improved ease of ADL completion  -GJ     LTG 4 Progress Met  -GJ     LTG 5 Pt will improve DASH score to </= 45% perceived disability to show overall improved quality of life.  -GJ     LTG 5 Progress Ongoing  -GJ     LTG 6 Pt will improve L shoulder function ER to >/= base of skull for improve ease of ADL completion.  -GJ     LTG 6 Progress Ongoing  -GJ               User Key  (r) = Recorded By, (t) = Taken By, (c) = Cosigned By      Initials Name Provider Type    Will Ward, PT Physical Therapist                    Therapy Education  Education Details: [id number], reviewed activity modifications, issued order for walker, discussed how to appropriately adjust walker  Given: HEP, Symptoms/condition management, Pain management, Posture/body mechanics, Fall prevention and home safety, Mobility training  Program: Reinforced, New, Progressed  How Provided: Verbal, Demonstration  Provided to: Patient  Level of Understanding: Teach back education performed, Verbalized, Demonstrated              Time Calculation:   Start Time: 0830  Stop Time: 0911  Time Calculation (min): 41 min  Timed Charges  84257 - PT Therapeutic Exercise Minutes:  32  83739 - PT Manual Therapy Minutes: 9  Total Minutes  Timed Charges Total Minutes: 41   Total Minutes: 41  Therapy Charges for Today       Code Description Service Date Service Provider Modifiers Qty    94074943618  PT THER PROC EA 15 MIN 3/19/2025 Will Gentile, PT GP 2    [id number]  PT MANUAL THERAPY EA 15 MIN 3/19/2025 Will Gentile, PT GP 1                      Will Gentile, PT  3/19/2025

## 2025-03-20 ENCOUNTER — OFFICE VISIT (OUTPATIENT)
Age: 50
End: 2025-03-20
Payer: MEDICAID

## 2025-03-20 VITALS
OXYGEN SATURATION: 99 % | SYSTOLIC BLOOD PRESSURE: 129 MMHG | BODY MASS INDEX: 27.72 KG/M2 | HEART RATE: 84 BPM | DIASTOLIC BLOOD PRESSURE: 83 MMHG | HEIGHT: 74 IN | WEIGHT: 216 LBS

## 2025-03-20 DIAGNOSIS — F33.3 SEVERE EPISODE OF RECURRENT MAJOR DEPRESSIVE DISORDER, WITH PSYCHOTIC FEATURES: Primary | ICD-10-CM

## 2025-03-20 DIAGNOSIS — F41.1 GENERALIZED ANXIETY DISORDER: ICD-10-CM

## 2025-03-20 DIAGNOSIS — F41.0 GENERALIZED ANXIETY DISORDER WITH PANIC ATTACKS: ICD-10-CM

## 2025-03-20 DIAGNOSIS — F41.1 GENERALIZED ANXIETY DISORDER WITH PANIC ATTACKS: ICD-10-CM

## 2025-03-20 DIAGNOSIS — F43.10 POST TRAUMATIC STRESS DISORDER (PTSD): ICD-10-CM

## 2025-03-20 RX ORDER — DESVENLAFAXINE 50 MG/1
50 TABLET, FILM COATED, EXTENDED RELEASE ORAL DAILY
Qty: 30 TABLET | Refills: 1 | Status: SHIPPED | OUTPATIENT
Start: 2025-03-20 | End: 2025-05-19

## 2025-03-20 NOTE — TREATMENT PLAN
Multi-Disciplinary Problems (from Behavioral Health Treatment Plan)      Active Problems       Problem: Assessment/EAP  Start Date: 02/26/25      Goal Priority Start Date Expected End Date End Date    Patient will utilize appropriate treatment services. -- 02/26/25 08/27/25 --    Goal Details: Progress toward goal:  Not appropriate to rate progress toward goal since this is the initial treatment plan.            Goal Intervention Frequency Start Date End Date    Assist patient in developing a plan of action Q2 Weeks 02/26/25 --    Intervention Details: Duration of treatment until remission of symptoms.                  Problem: Anxiety  Start Date: 02/26/25      Problem Details: Client's DILLON-7 score was a 21 out of 21           Goal Priority Start Date Expected End Date End Date    Patient will develop and implement behavioral and cognitive strategies to reduce anxiety and irrational fears. -- 02/26/25 08/27/25 --    Goal Details: Progress toward goal:  Not appropriate to rate progress toward goal since this is the initial treatment plan.          Goal Intervention Frequency Start Date End Date    Help patient explore past emotional issues in relation to present anxiety. Q2 Weeks 02/26/25 --    Intervention Details: Duration of treatment until remission of symptoms.          Goal Intervention Frequency Start Date End Date    Help patient develop an awareness of their cognitive and physical responses to anxiety. Q2 Weeks 02/26/25 --    Intervention Details: Duration of treatment until remission of symptoms.                  Problem: Depression  Start Date: 02/26/25      Problem Details: Client's PHQ-9 was a 27 out of 27           Goal Priority Start Date Expected End Date End Date    Patient will demonstrate the ability to initiate new constructive life skills outside of sessions on a consistent basis. -- 02/26/25 08/27/25 --    Goal Details: Progress toward goal:  Not appropriate to rate progress toward goal since  this is the initial treatment plan.          Goal Intervention Frequency Start Date End Date    Assist patient in setting attainable activities of daily living goals. Q2 Weeks 02/26/25 --      Goal Intervention Frequency Start Date End Date    Provide education about depression Q2 Weeks 02/26/25 --    Intervention Details: Duration of treatment until remission of symptoms.          Goal Intervention Frequency Start Date End Date    Assist patient in developing healthy coping strategies. Q2 Weeks 02/26/25 --    Intervention Details: Duration of treatment until remission of symptoms.                  Problem: Trauma and Stressor Related Disorders  Start Date: 02/26/25      Problem Details: Client's PCL-5 was a 67 out of 80        Goal Priority Start Date Expected End Date End Date    Patient will process and move through trauma in a way that improves self regard and the patients ability to function optimally in the world around them. -- 02/26/25 08/27/25 --    Goal Details: Progress toward goal:  Not appropriate to rate progress toward goal since this is the initial treatment plan.          Goal Intervention Frequency Start Date End Date    Assist patient in identifying ways that trauma has negatively impacted their view of themselves and the world. Q2 Weeks 02/26/25 --    Intervention Details: Duration of treatment until remission of symptoms.          Goal Intervention Frequency Start Date End Date    Process trauma in the context of the safe session environment. Q2 Weeks 02/26/25 --    Intervention Details: Duration of treatment until remission of symptoms.          Goal Intervention Frequency Start Date End Date    Develop a plan of behavior changes that will reduce the stress of the trauma. Q2 Weeks 02/26/25 --    Intervention Details: Duration of treatment until remission of symptoms.                          Reviewed By       Juli Lancaster LCSW 03/20/25 1401                     I have discussed and reviewed  this treatment plan with the patient.

## 2025-03-20 NOTE — PROGRESS NOTES
[facility]      Follow Up Adult Note     Date:2025   Client Name: Eric Lay  : 1975   MRN: [id number]   Time IN: 1:05 PM   Time OUT: 1:59 PM     Referring Provider: Jeffery Etienne MD    Chief Complaint:      ICD-10-CM ICD-9-CM   1. Severe episode of recurrent major depressive disorder, with psychotic features  F33.3 296.34   2. Generalized anxiety disorder  F41.1 300.02   3. Post traumatic stress disorder (PTSD)  F43.10 309.81        History of Present Illness:   Eric Lay is a 49 y.o. male who is being seen today for follow up individual psychotherapy counseling for depression, anxiety, and PTSD.       Objective     PHQ-9 Depression Screening  Little interest or pleasure in doing things? Almost all   Feeling down, depressed, or hopeless? Almost all   PHQ-2 Total Score 6   Trouble falling or staying asleep, or sleeping too much? Almost all   Feeling tired or having little energy? Almost all   Poor appetite or overeating? Almost all   Feeling bad about yourself - or that you are a failure or have let yourself or your family down? Almost all   Trouble concentrating on things, such as reading the newspaper or watching television? Almost all   Moving or speaking so slowly that other people could have noticed? Or the opposite - being so fidgety or restless that you have been moving around a lot more than usual? Almost all   Thoughts that you would be better off dead, or of hurting yourself in some way? Over half   PHQ-9 Total Score 26   If you checked off any problems, how difficult have these problems made it for you to do your work, take care of things at home, or get along with other people? Very difficult        DILLON-7  Feeling nervous, anxious or on edge: Nearly every day  Not being able to stop or control worrying: Nearly every day  Worrying too much about different things: Nearly every day  Trouble Relaxing: Nearly every day  Being so restless that it is  hard to sit still: Nearly every day  Feeling afraid as if something awful might happen: Nearly every day  Becoming easily annoyed or irritable: Nearly every day  DILLON 7 Total Score: 21  If you checked any problems, how difficult have these problems made it for you to do your work, take care of things at home, or get along with other people: Very difficult      Medications:     Current Outpatient Medications:     Brexpiprazole (Rexulti) 0.5 MG tablet, Take 0.5 mg by mouth Daily., Disp: 30 tablet, Rfl: 1    busPIRone (BUSPAR) 15 MG tablet, Take 1 tablet by mouth 2 (Two) Times a Day for 60 days., Disp: 60 tablet, Rfl: 1    desvenlafaxine (Pristiq) 50 MG 24 hr tablet, Take 1 tablet by mouth Daily for 60 days., Disp: 30 tablet, Rfl: 1    HYDROcodone-acetaminophen (NORCO) 7.5-325 MG per tablet, Take 1 tablet by mouth 3 times a day., Disp: , Rfl:     meloxicam (MOBIC) 15 MG tablet, Take 1 tablet by mouth Daily., Disp: 90 tablet, Rfl: 1    naloxone (NARCAN) 4 MG/0.1ML nasal spray, Administer 1 spray into the nostril(s) as directed by provider As Needed., Disp: , Rfl:     traZODone (DESYREL) 150 MG tablet, Take 1 tablet by mouth every night at bedtime., Disp: 90 tablet, Rfl: 1    zolpidem CR (AMBIEN CR) 12.5 MG CR tablet, Take 1 tablet by mouth every night at bedtime., Disp: 30 tablet, Rfl: 0    Allergies:   Allergies   Allergen Reactions    Bee Venom Hives    Cat Dander Itching    Dust Mite Extract Itching         Subjective     Mental Status Exam:   MENTAL STATUS EXAM   General Appearance:  Cleanly groomed and dressed  Eye Contact:  Good eye contact  Attitude:  Cooperative  Motor Activity:  Normal gait, posture  Muscle Strength:  Normal  Speech:  Normal rate, tone, volume  Language:  Stereotypical and unspontaneous  Thought Process:  Logical and goal-directed  Associations/ Thought Content:  No delusions  Hallucinations:  None  Suicidal Ideations:  Passive ideation  Homicidal Ideation:  Not present  Sensorium:  Alert and  clear  Orientation:  Person, place, time and situation  Immediate Recall, Recent, and Remote Memory:  Intact  Attention Span/ Concentration:  Good  Fund of Knowledge:  Appropriate for age and educational level  Intellectual Functioning:  Average range  Insight:  Good  Judgement:  Good  Reliability:  Good  Impulse Control:  Good       Client's Support Network Includes:  wife    Functional Status: No impairment    Progress toward goal: Not at goal    Prognosis: Good with Ongoing Treatment     Assessment / Plan / Progress     Visit Diagnosis/Orders Placed This Visit:    ICD-10-CM ICD-9-CM   1. Severe episode of recurrent major depressive disorder, with psychotic features  F33.3 296.34   2. Generalized anxiety disorder  F41.1 300.02   3. Post traumatic stress disorder (PTSD)  F43.10 309.81        SUICIDE RISK ASSESSMENT/CSSRS:  1. Does client have thoughts of suicide? Patient denies  2. Does client have intent for suicide? Patient denies  3. Does client have a current plan for suicide? Patient denies   4. History of suicide attempts: Yes   5. Family history of suicide or attempts: Patient denies   6. History of violent behaviors towards others or property or thoughts of committing suicide: Yes   7. History of sexual aggression toward others: Patient denies   8. Access to firearms or weapons: Patient denies     PLAN:  Safety: No acute safety concerns  Risk Assessment: Risk of self-harm acutely is low. Risk of self-harm chronically is also low, but could be further elevated in the event of treatment noncompliance and/or AODA.    Presenting Problem: Client reported that PATRICIA Chris changed his medication due to side effects. He reported that Corinna advised him to make an appointment with his neurologist to be tested for dementia and inquire about driving again. He reported that he is unsure if he wants to drive again because he experiences lapses of time and cannot drive anywhere without a GPS. He reported  that he experiences lapses of times 2-5 times a day. He reported that it's like grasping at fog with his memory. He reported that his wife and his son get frustrated with him because of his memory issues. He reported that he applied for disability, however, it can take up to a year to hear back. He discussed his dog Debra and how she improves his mood.     Treatment Plan/Goals & Progress: Continue supportive psychotherapy efforts and medications as indicated. Treatment options discussed during today's visit. Client ackowledged and verbally consented to continue with current treatment plan and was educated on the importance of compliance with treatment and follow-up appointments. Client seems reasonably able to adhere to treatment plan.      Assisted client in processing above session content; acknowledged and normalized client’s thoughts, feelings, and concerns.  Rationalized client's thought process regarding depression, anxiety, and PTSD.      Allowed client to freely discuss issues without interruption or judgement with unconditional positive regard, active listening skills, and empathy. Clinician provided a safe, confidential environment to facilitate the development of a positive therapeutic relationship and encouraged open, honest communication. Assisted client in identifying risk factors which would indicate the need for higher level of care including thoughts to harm self or others and/or self-harming behavior and encouraged client to contact this office, call 911, or present to the nearest emergency room should any of these events occur. Discussed crisis intervention services and means to access. Client adamantly and convincingly denies current suicidal or homicidal ideation or perceptual disturbance. Assisted client in processing session content; acknowledged and normalized client’s thoughts, feelings, and concerns by utilizing a person-centered approach in efforts to build appropriate rapport and a  positive therapeutic relationship with open and honest communication.       Follow Up:   Return in about 2 weeks (around 4/3/2025) for Next scheduled follow up.    Juli Lancaster LCSW  [facility]   Other Specify

## 2025-03-20 NOTE — PROGRESS NOTES
"     Office  Follow Up Visit      Patient Name: Eric Lay  : 1975   MRN: [id number]     Referring Provider: Jeffery Etienne MD    Chief Complaint:  \"depression\"    ICD-10-CM ICD-9-CM   1. Severe episode of recurrent major depressive disorder, with psychotic features  F33.3 296.34   2. Post traumatic stress disorder (PTSD)  F43.10 309.81   3. Generalized anxiety disorder with panic attacks  F41.1 300.02    F41.0 300.01      History of Present Illness:   Eric Lay is a 49 y.o. male who is here today for follow up. Patient was seen for initial evaluation on 2025.  Patient reported a history of PTSD, depression, and anxiety.  Reported that he has been dealing with anxiety, depression, and trauma related symptoms majority of his life.  Patient also reported that he has had difficulty with memory for a little over a year without any known cause (patient did talk to his neurologist about this).  Patient started seeing neurology after experiencing seizure-like episodes that started about a year ago.  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  Patient came to provider taking Zoloft 25 mg daily, trazodone 150 mg nightly, Ambien 12.5 mg nightly, and BuSpar 15 mg twice daily.  Patient has tried to increase Zoloft to 50 mg daily but experienced sexual side effects.  During initial evaluation patient was diagnosed with MDD, PTSD, and DILLON with panic attacks.  Discussed with patient that his memory issues may be related to his depression.  Discussed with patient that we will focus on treating patient's depression and trauma related symptoms which will ultimately hopefully help patient's memory and nonepileptic seizures.  Patient did report during this evaluation that he does experience some vague auditory hallucinations which may be related to his depression.  Patient was started on Rexulti 0.5 mg daily to help with " "depressive symptoms and trauma related symptoms.  Patient was continued on Zoloft 25 mg daily, Ambien 12.5 mg nightly, trazodone 150 mg nightly, and BuSpar 15 mg twice daily.  Notify patient that we would most likely change his antidepressant during next visit.   2/21/2025: Today patient reports some improvement in anxiety, depression, and trauma-related symptoms with the added Rexulti but patient's PHQ-9 and DILLON-7 scores are still very elevated today.  Patient continues to have episodes of \"spacing out\" which neurology believes are non epileptic seizures.  Would like to try patient on a different antidepressant to help with anxiety, depressive symptoms, and trauma related symptoms.  Patient did experience side effects with increasing the Zoloft to 50 mg daily, so will cross-taper patient from Zoloft to Prozac 20 mg daily. Encourage patient to continue with therapy.  Will see patient in 4 weeks.  Will continue Rexulti 0.5 mg daily and BuSpar 15 mg twice daily.  Discussed with patient that we will continue to try to treat his anxiety, depression, and trauma related symptoms which will hopefully ultimately treat his nonepileptic seizures and memory issues.  Safety plan reviewed with the patient can be found in patient's chart.  Referral placed to sleep medicine to rule out sleep apnea.     Today patient reports to still be struggling with anxiety, trauma related symptoms, and depressive symptoms.  Reports to experience fatigue, lack of motivation, poor appetite, and difficulty concentrating.  Reports to still have passive thoughts of death but denies any intent or plan to harm self.  Patient adamantly denies SI today. States that he has not noticed any improvement with the Prozac and was also experiencing sexual side effects with the medication.  Reports to still have daily episodes of \"spacing out\".  States that he also has difficulty with his short-term memory.  Reports to be sleeping only about 4 hours each night.  " States he has an appointment with sleep medicine in April.  No change in appetite.  Denies SI/HI/AVH.    Subjective      Review of Systems:   Review of Systems   Constitutional:  Negative for appetite change.   Respiratory:  Negative for chest tightness and shortness of breath.    Gastrointestinal:  Negative for abdominal pain, constipation, diarrhea, nausea and vomiting.   Genitourinary:  Negative for difficulty urinating.   Neurological:  Negative for headaches.   Psychiatric/Behavioral:  Negative for hallucinations and suicidal ideas. The patient is nervous/anxious.         Depressed.      PHQ-9 Depression Screening  Little interest or pleasure in doing things? Nearly every day   Feeling down, depressed, or hopeless? Nearly every day   PHQ-2 Total Score 6   Trouble falling or staying asleep, or sleeping too much? Nearly every day   Feeling tired or having little energy? Nearly every day   Poor appetite or overeating? Nearly every day   Feeling bad about yourself - or that you are a failure or have let yourself or your family down? Nearly every day   Trouble concentrating on things, such as reading the newspaper or watching television? Nearly every day   Moving or speaking so slowly that other people could have noticed? Or the opposite - being so fidgety or restless that you have been moving around a lot more than usual? Nearly every day     Thoughts that you would be better off dead, or of hurting yourself in some way? More than half the days   PHQ-9 Total Score 26   If you checked off any problems, how difficult have these problems made it for you to do your work, take care of things at home, or get along with other people? Very difficult       DILLON-7      Over the last two weeks, how often have you been bothered by the following problems?  Feeling nervous, anxious or on edge: Nearly every day  Not being able to stop or control worrying: Nearly every day  Worrying too much about different things: Nearly every  day  Trouble Relaxing: Nearly every day  Being so restless that it is hard to sit still: Nearly every day  Becoming easily annoyed or irritable: Nearly every day  Feeling afraid as if something awful might happen: Nearly every day  DILLON 7 Total Score: 21  If you checked any problems, how difficult have these problems made it for you to do your work, take care of things at home, or get along with other people: Very difficult    Patient History:   The following portions of the patient's history were reviewed and updated as appropriate: allergies, current medications, past family history, past medical history, past social history, past surgical history and problem list.     Social History     Socioeconomic History    Marital status:      Spouse name: Angelita    Years of education: High school   Tobacco Use    Smoking status: Every Day     Current packs/day: 1.00     Average packs/day: 1 pack/day for 25.0 years (25.0 ttl pk-yrs)     Types: Cigarettes    Smokeless tobacco: Never   Vaping Use    Vaping status: Never Used   Substance and Sexual Activity    Alcohol use: Never    Drug use: Yes    Sexual activity: Yes     Partners: Female     Birth control/protection: None     Comment:      Medications:     Current Outpatient Medications:     Brexpiprazole (Rexulti) 0.5 MG tablet, Take 0.5 mg by mouth Daily., Disp: 30 tablet, Rfl: 1    busPIRone (BUSPAR) 15 MG tablet, Take 1 tablet by mouth 2 (Two) Times a Day for 60 days., Disp: 60 tablet, Rfl: 1    desvenlafaxine (Pristiq) 50 MG 24 hr tablet, Take 1 tablet by mouth Daily for 60 days., Disp: 30 tablet, Rfl: 1    HYDROcodone-acetaminophen (NORCO) 7.5-325 MG per tablet, Take 1 tablet by mouth 3 times a day., Disp: , Rfl:     meloxicam (MOBIC) 15 MG tablet, Take 1 tablet by mouth Daily., Disp: 90 tablet, Rfl: 1    naloxone (NARCAN) 4 MG/0.1ML nasal spray, Administer 1 spray into the nostril(s) as directed by provider As Needed., Disp: , Rfl:     traZODone (DESYREL)  "150 MG tablet, Take 1 tablet by mouth every night at bedtime., Disp: 90 tablet, Rfl: 1    zolpidem CR (AMBIEN CR) 12.5 MG CR tablet, Take 1 tablet by mouth every night at bedtime., Disp: 30 tablet, Rfl: 0    Objective     Physical Exam:  Vital Signs:   Vitals:    03/20/25 1139   BP: 129/83   Pulse: 84   SpO2: 99%   Weight: 98 kg (216 lb)   Height: 188 cm (74\")     Body mass index is 27.73 kg/m².       Mental Status Exam:   MENTAL STATUS EXAM   General Appearance:  Cleanly groomed and dressed  Eye Contact:  Good eye contact  Attitude:  Cooperative  Motor Activity:  Normal gait, posture  Muscle Strength:  Normal  Speech:  Normal rate, tone, volume  Language:  Spontaneous  Mood and affect:  Depressed  Hopelessness:  Denies  Loneliness: Denies  Thought Process:  Logical  Associations/ Thought Content:  No delusions  Hallucinations:  None  Suicidal Ideations:  Not present  Homicidal Ideation:  Not present  Sensorium:  Alert and clear  Orientation:  Person, place, time and situation  Attention Span/ Concentration:  Good  Fund of Knowledge:  Appropriate for age and educational level  Intellectual Functioning:  Average range  Insight:  Good  Judgement:  Good  Reliability:  Good  Impulse Control:  Good       @RESULASTCBCDIFFPANEL,TSH,LABLIPI,ITEVAYVO92,HORBEPSR99,MG,FOLATE,PROLACTIN,CRPRESULT,CMP,M0SBXZFIALQ)@    Lab Results   Component Value Date    GLUCOSE 93 01/29/2025    BUN 19 01/29/2025    CREATININE 1.33 (H) 01/29/2025    EGFR 64.7 01/29/2025    BCR 14.3 01/29/2025    K 4.8 01/29/2025    CO2 22.6 01/29/2025    CALCIUM 9.8 01/29/2025    ALBUMIN 4.4 01/29/2025    BILITOT 0.5 01/29/2025    AST 19 01/29/2025    ALT 34 01/29/2025       Lab Results   Component Value Date    WBC 11.26 (H) 01/29/2025    HGB 17.6 01/29/2025    HCT 52.5 (H) 01/29/2025    MCV 92.1 01/29/2025     01/29/2025       Lab Results   Component Value Date    CHLPL 216 (H) 01/29/2025    TRIG 111 01/29/2025    HDL 38 (L) 01/29/2025     (H) " 01/29/2025       TSH          1/29/2025    08:35   TSH   TSH 2.510           Assessment / Plan      Assessment:   Patient is a 49-year-old male with a history of PTSD, DILLON with panic attacks, and MDD.  Patient was last seen on 2/21/2025 when he reported some improvement in depression with starting Rexulti 0.5 mg daily.  Discussed with patient that I would like to switch his antidepressant to optimize the dose. Patient was taking Zoloft 25 mg daily but with experienced side effects when increased to 50 mg daily.  During last visit Zoloft was discontinued and patient was started on Prozac 20 mg daily to hopefully improve depressive symptoms, anxiety, and trauma-related symptoms.  Patient was continued on Rexulti 0.5 mg daily, BuSpar 15 mg twice daily, trazodone 150 mg nightly, and Ambien 12.5 mg nightly as needed for sleep. Today patient reports to still be struggling with anxiety, depressive symptoms, and trauma related symptoms.  Patient's PHQ-9 and DILLON-7 scores are elevated today.  States he has not noticed any improvement with the Prozac and is experiencing sexual side effects with Prozac.  Will switch patient to an antidepressant that is less likely to cause sexual side effects.  Will discontinue Prozac and start patient on Pristiq 50 mg daily.  Hopefully Pristiq will help with some of patient's fatigue and lack of motivation depressive type symptoms.  Pristiq should also help with patient's anxiety and trauma related symptoms. Will continue Rexulti 0.5 mg daily, BuSpar 15 mg twice daily, trazodone 150 mg nightly, and Ambien 12.5 mg nightly as needed for sleep for now.  Safety plan completed with patient and can be found in patient's chart.  Hopefully patient's memory improves as his trauma symptoms and depressive symptoms improve but would like patient to reach back out to neurology for further dementia evaluation or neuropsych testing to rule out dementia.  Patient will be seeing sleep medicine in April to rule  out sleep apnea.    Diagnoses and all orders for this visit:    1. Severe episode of recurrent major depressive disorder, with psychotic features (Primary)    2. Post traumatic stress disorder (PTSD)    3. Generalized anxiety disorder with panic attacks    Other orders  -     desvenlafaxine (Pristiq) 50 MG 24 hr tablet; Take 1 tablet by mouth Daily for 60 days.  Dispense: 30 tablet; Refill: 1       Plan:   - Discontinue Prozac due to sexual side effects and start Pristiq 50 mg daily.  - Will continue Rexulti 0.5 mg daily, BuSpar 15 mg twice daily, trazodone 150 mg nightly, and Ambien 12.5 mg nightly as needed for sleep.   - Continue with therapy in clinic.  - Discussed medication options and treatment plan of prescribed medication as well as the risks, benefits, and side effects   - Encouraged pt to continue supportive psychotherapy efforts and medications as indicated.  - Educated pt on signs and symptoms of serotonin syndrome and notified pt to go to the ER if experiencing these symptoms.   - Notified pt that antidepressants can sometimes cause worsening SI and to monitor for this.   - Notified pt that antipsychotics can increase cholesterol levels, blood sugar, wt, and BP.   - Educated pt on EPS/TD and to notify provider is they experience these symptoms.      Short-term goals: Continue to develop rapport with patient.     Long-term goals: Symptom reduction with medication and therapy.     Weakness: Tobacco use.     Strengths: Great support from wife.    Continue supportive psychotherapy efforts and medications as indicated. Treatment and medication options discussed during today's visit. Patient ackowledged and verbally consented to continue with current treatment plan and was educated on the importance of compliance with treatment and follow-up appointments. Patient seems reasonably able to adhere to treatment plan.      Medication Considerations:  Discussed medication options and treatment plan of prescribed  medication(s) as well as the risks, benefits, and potential side effects. Patient is agreeable to call the office with any worsening of symptoms or onset of side effects. Patient is agreeable to call 911 or go to the nearest ER should he/she begin having SI/HI.    Quality Measures:   Patient does smoke 1 pack a day. Provided education on smoking cessation and risk of using tobacco products.    Dread reviewed and is appropriate.    Follow Up:   Return in about 4 weeks (around 4/17/2025) for Recheck.    Copied text in this note has been reviewed and is accurate as of 3/20/2025.    Part of this note may be an electronic transcription/translation of spoken language to printed text using the Dragon Dictation System.    PATRICIA Covington, PMHNP-BC

## 2025-03-25 ENCOUNTER — HOSPITAL ENCOUNTER (OUTPATIENT)
Dept: PHYSICAL THERAPY | Facility: HOSPITAL | Age: 50
Setting detail: THERAPIES SERIES
Discharge: HOME | End: 2025-03-25
Payer: MEDICAID

## 2025-03-25 DIAGNOSIS — Z74.09 IMPAIRED MOBILITY: ICD-10-CM

## 2025-03-25 DIAGNOSIS — M25.612 DECREASED RANGE OF MOTION OF LEFT SHOULDER: ICD-10-CM

## 2025-03-25 DIAGNOSIS — G89.29 CHRONIC LEFT SHOULDER PAIN: Primary | ICD-10-CM

## 2025-03-25 DIAGNOSIS — M25.512 CHRONIC LEFT SHOULDER PAIN: Primary | ICD-10-CM

## 2025-03-25 PROCEDURE — 97110 THERAPEUTIC EXERCISES: CPT

## 2025-03-25 NOTE — THERAPY TREATMENT NOTE
Outpatient Physical Therapy Ortho Treatment Note  [facility]     Patient Name: Eric Lay  : 1975  MRN: [id number]  Today's Date: 3/25/2025      Visit Date: 2025    Visit Dx:    ICD-10-CM ICD-9-CM   1. Chronic left shoulder pain  M25.512 719.41    G89.29 338.29   2. Decreased range of motion of left shoulder  M25.612 719.51   3. Impaired mobility  Z74.09 799.89       Patient Active Problem List   Diagnosis    Leukocytosis    Nicotine dependence    Severe episode of recurrent major depressive disorder, with psychotic features    Generalized anxiety disorder    Post traumatic stress disorder (PTSD)        Past Medical History:   Diagnosis Date    ADHD (attention deficit hyperactivity disorder)     Anxiety     Arthritis     Asthma     Depression     Headache     Hyperlipidemia     Hypertension     Liver disease     Fatty liver    Low back pain     Seizures     Type 2 diabetes mellitus         Past Surgical History:   Procedure Laterality Date    SPINE SURGERY      Osteoblastoma benign removed                        PT Assessment/Plan       Row Name 25 0900          PT Assessment    Assessment Comments Eric returns to PT this date amb with rwx, much improved gait mechanics with new AD. He reports continued relief in shoulder pain since switching off the cane and new stretches. Continued with shoulder girdle strengthening with addition of supine diagonal pulls, theraloop flexion, and skull crushers with great tolerance. At this time, he does report good alleviation in pain, has improved overall functional mobility, and is ready to trial transition to IND management at next session. He reports greatest difficulty with reaching behind his back, will plan to assess at next session and update HEP for d/c.  -MO        PT Plan    PT Plan Comments d/c to HEP. FIR dowel stretch, provide all bands, supine ABC, sleeper stretch, beach chair stretch  -MO               User Key  (r) = Recorded By, (t)  = Taken By, (c) = Cosigned By      Initials Name Provider Type    Fe Singer, PT Physical Therapist                       OP Exercises       Row Name 03/25/25 0800             Subjective    Subjective Comments The rwx has been really good, much less pain since switching  -MO         Total Minutes    00309 - PT Therapeutic Exercise Minutes 38  -MO         Exercise 1    Exercise Name 1 UBE/pulleys  -MO      Reps 1 4 mins UBE  -MO      Time 1 2 min for pulleys  -MO         Exercise 3    Exercise Name 3 supine diagonal pulls  -MO      Cueing 3 Verbal;Demo  -MO      Sets 3 2  -MO      Reps 3 15  -MO      Time 3 GTB  -MO         Exercise 5    Exercise Name 5 supine HA, B ER  -MO      Cueing 5 Verbal  -MO      Sets 5 2e  -MO      Reps 5 15  -MO      Time 5 GTB  -MO         Exercise 7    Exercise Name 7 seated scapular retraction  -MO      Cueing 7 Verbal  -MO      Sets 7 2  -MO      Reps 7 15  -MO      Time 7 Blue TB  -MO         Exercise 8    Exercise Name 8 supine theraloop flex  -MO      Cueing 8 Verbal;Demo  -MO      Sets 8 2  -MO      Reps 8 15  -MO      Time 8 GTB loop at wrist  -MO         Exercise 10    Exercise Name 10 SL ER  -MO      Cueing 10 Verbal  -MO      Sets 10 2  -MO      Reps 10 10  -MO      Time 10 1#  -MO         Exercise 11    Exercise Name 11 supine serratus punch  -MO      Cueing 11 Verbal  -MO      Sets 11 2  -MO      Reps 11 15  -MO      Time 11 6#  -MO         Exercise 12    Exercise Name 12 SL flexion  -MO      Cueing 12 Verbal  -MO      Reps 12 2  -MO      Time 12 10  -MO         Exercise 13    Exercise Name 13 SL abd  -MO      Cueing 13 Verbal  -MO      Sets 13 2  -MO      Reps 13 10  -MO      Time 13 0#  -MO         Exercise 14    Exercise Name 14 supine skull   -MO      Sets 14 2  -MO      Reps 14 15  -MO      Time 14 6#  -MO                User Key  (r) = Recorded By, (t) = Taken By, (c) = Cosigned By      Initials Name Provider Type    Fe Singer, PT Physical Therapist                                                     Time Calculation:   Start Time: 0830  Stop Time: 0908  Time Calculation (min): 38 min  Timed Charges  47318 - PT Therapeutic Exercise Minutes: 38  Total Minutes  Timed Charges Total Minutes: 38   Total Minutes: 38  Therapy Charges for Today       Code Description Service Date Service Provider Modifiers Qty    14669248227 HC PT THER PROC EA 15 MIN 3/25/2025 Fe Maldonado, PT GP 3                      Fe Maldonado, CB  3/25/2025

## 2025-03-26 ENCOUNTER — OFFICE VISIT (OUTPATIENT)
Dept: FAMILY MEDICINE CLINIC | Facility: CLINIC | Age: 50
End: 2025-03-26
Payer: MEDICAID

## 2025-03-26 VITALS
TEMPERATURE: 99.9 F | DIASTOLIC BLOOD PRESSURE: 74 MMHG | OXYGEN SATURATION: 100 % | BODY MASS INDEX: 27.85 KG/M2 | WEIGHT: 217 LBS | HEIGHT: 74 IN | HEART RATE: 97 BPM | SYSTOLIC BLOOD PRESSURE: 120 MMHG

## 2025-03-26 DIAGNOSIS — F41.9 ANXIETY: ICD-10-CM

## 2025-03-26 DIAGNOSIS — R26.9 GAIT ABNORMALITY: ICD-10-CM

## 2025-03-26 DIAGNOSIS — Z79.899 HIGH RISK MEDICATION USE: Primary | ICD-10-CM

## 2025-03-26 DIAGNOSIS — N50.812 LEFT TESTICULAR PAIN: ICD-10-CM

## 2025-03-26 DIAGNOSIS — M50.30 DEGENERATIVE DISC DISEASE, CERVICAL: ICD-10-CM

## 2025-03-26 DIAGNOSIS — M51.362 DEGENERATION OF INTERVERTEBRAL DISC OF LUMBAR REGION WITH DISCOGENIC BACK PAIN AND LOWER EXTREMITY PAIN: ICD-10-CM

## 2025-03-26 RX ORDER — CICLOPIROX OLAMINE 7.7 MG/G
1 CREAM TOPICAL 2 TIMES DAILY
Qty: 30 G | Refills: 0 | Status: SHIPPED | OUTPATIENT
Start: 2025-03-26

## 2025-03-31 ENCOUNTER — TELEPHONE (OUTPATIENT)
Dept: NEUROLOGY | Facility: CLINIC | Age: 50
End: 2025-03-31
Payer: MEDICAID

## 2025-03-31 NOTE — TELEPHONE ENCOUNTER
Patient requested appointment for memory concerns. Per Dr Castaneda: Defer to Dr Etienne or PCP, if they think he needs a memory work up they can refer.     Patient is aware and will reach out to PCP.

## 2025-03-31 NOTE — TELEPHONE ENCOUNTER
Rx Refill Note  Requested Prescriptions     Pending Prescriptions Disp Refills    Rexulti 0.5 MG tablet [Pharmacy Med Name: Rexulti 0.5 MG Oral Tablet] 30 tablet 0     Sig: Take 1 tablet by mouth once daily      Last office visit with prescribing clinician: 3/20/2025   Last telemedicine visit with prescribing clinician: Visit date not found   Next office visit with prescribing clinician: 4/22/2025     Lena Vaz  03/31/25, 08:13 EDT

## 2025-03-31 NOTE — PROGRESS NOTES
"  Subjective   Eric Lay is a 49 y.o. male who is here for   Chief Complaint   Patient presents with    Follow-up   .     History of Present Illness   History of Present Illness  Eric Lay to the office for follow-up.  Patient presents for follow-up of his testicular pain.  Patient also complains of intermittent scrotum pain.  Patient states this has been present for the past 10 years but he has declined workup prior due to his father and grandfather having complications following hernia surgery.  Patient states he has increased pain with lifting or straining.  Patient had ultrasound that did not reveal any inguinal hernia or testicular torsion.    Patient also states his anxiety has been slightly elevated.  Patient states he does have follow-up with counseling as well as psychiatry.    Patient also has longstanding history of chronic back pain and neck pain.  He is currently seeing pain management.  Patient finds it difficult to drive due to the medication he takes as well as his medical comorbidities.  Review of Systems   Constitutional:  Negative for activity change and appetite change.   Respiratory:  Negative for cough and shortness of breath.    Cardiovascular:  Negative for chest pain and leg swelling.   Musculoskeletal:  Positive for arthralgias, back pain and neck pain.   Skin:  Negative for color change and rash.   Neurological:  Positive for weakness.   Psychiatric/Behavioral:  Positive for dysphoric mood and sleep disturbance. The patient is nervous/anxious.        Objective   Vitals:    03/26/25 1351   BP: 120/74   BP Location: Left arm   Patient Position: Sitting   Cuff Size: Adult   Pulse: 97   Temp: 99.9 °F (37.7 °C)   SpO2: 100%   Weight: 98.4 kg (217 lb)   Height: 188 cm (74.02\")      Physical Exam  Vitals and nursing note reviewed.   Constitutional:       Appearance: Normal appearance. He is normal weight.   HENT:      Head: Normocephalic and atraumatic.   Cardiovascular:      Rate and " Rhythm: Normal rate and regular rhythm.      Pulses: Normal pulses.      Heart sounds: No murmur heard.  Pulmonary:      Effort: Pulmonary effort is normal. No respiratory distress.      Breath sounds: Normal breath sounds. No wheezing.   Skin:     General: Skin is warm and dry.   Neurological:      General: No focal deficit present.      Mental Status: He is alert.      Motor: Weakness present.      Gait: Gait abnormal.   Psychiatric:         Mood and Affect: Mood normal.         Thought Content: Thought content normal.       Physical Exam        Assessment & Plan   Assessment & Plan    Diagnoses and all orders for this visit:    1. High risk medication use (Primary)  We will obtain urine drug screen  -     Compliance Drug Analysis, Ur - Urine, Clean Catch    2. Left testicular pain  Unclear etiology.  Normal testicular ultrasound.  Will refer to urology for further evaluation.  -     Ambulatory Referral to Urology    3. Anxiety  Stable.  Continue follow-up with psychiatry.  Currently on BuSpar 15 mg twice daily.  Currently on Pristiq 50 mg daily.  4. Degeneration of intervertebral disc of lumbar region with discogenic back pain and lower extremity pain  Currently seeing pain management  5. Degenerative disc disease, cervical  Currently seeing pain management.  Continue current medications.  Will hold from driving due to patient being on chronic pain medications.  6. Gait abnormality  Able.  Other orders  -     ciclopirox (LOPROX) 0.77 % cream; Apply 1 Application topically to the appropriate area as directed 2 (Two) Times a Day.  Dispense: 30 g; Refill: 0      Results      There are no Patient Instructions on file for this visit.    There are no discontinued medications.     Return in about 2 weeks (around 4/9/2025), or Insomnia.             Jeffery Etienne MD  [place], [place]

## 2025-04-01 ENCOUNTER — HOSPITAL ENCOUNTER (OUTPATIENT)
Dept: PHYSICAL THERAPY | Facility: HOSPITAL | Age: 50
Setting detail: THERAPIES SERIES
Discharge: HOME | End: 2025-04-01
Payer: MEDICAID

## 2025-04-01 DIAGNOSIS — M25.512 CHRONIC LEFT SHOULDER PAIN: Primary | ICD-10-CM

## 2025-04-01 DIAGNOSIS — Z74.09 IMPAIRED MOBILITY: ICD-10-CM

## 2025-04-01 DIAGNOSIS — G89.29 CHRONIC LEFT SHOULDER PAIN: Primary | ICD-10-CM

## 2025-04-01 DIAGNOSIS — M25.612 DECREASED RANGE OF MOTION OF LEFT SHOULDER: ICD-10-CM

## 2025-04-01 PROCEDURE — 97110 THERAPEUTIC EXERCISES: CPT

## 2025-04-01 RX ORDER — BREXPIPRAZOLE 0.5 MG/1
1 TABLET ORAL DAILY
Qty: 30 TABLET | Refills: 1 | Status: SHIPPED | OUTPATIENT
Start: 2025-04-01

## 2025-04-01 NOTE — THERAPY DISCHARGE NOTE
Outpatient Physical Therapy Ortho Treatment Note/Discharge Summary  [facility]     Patient Name: Eric Lay  : 1975  MRN: [id number]  Today's Date: 2025      Visit Date: 2025    Visit Dx:    ICD-10-CM ICD-9-CM   1. Chronic left shoulder pain  M25.512 719.41    G89.29 338.29   2. Decreased range of motion of left shoulder  M25.612 719.51   3. Impaired mobility  Z74.09 799.89       Patient Active Problem List   Diagnosis    Leukocytosis    Nicotine dependence    Severe episode of recurrent major depressive disorder, with psychotic features    Generalized anxiety disorder    Post traumatic stress disorder (PTSD)        Past Medical History:   Diagnosis Date    ADHD (attention deficit hyperactivity disorder)     Anxiety     Arthritis     Asthma     Depression     Headache     Hyperlipidemia     Hypertension     Liver disease     Fatty liver    Low back pain     Seizures     Type 2 diabetes mellitus         Past Surgical History:   Procedure Laterality Date    SPINE SURGERY      Osteoblastoma benign removed        PT Ortho       Row Name 25 0800       Left Upper Ext    Lt Shoulder Abduction AROM 160 standing  -MO    Lt Shoulder Flexion AROM 165 standing  -MO    Lt Shoulder External Rotation PROM AYANA T3  -MO              User Key  (r) = Recorded By, (t) = Taken By, (c) = Cosigned By      Initials Name Provider Type    Fe Singer, PT Physical Therapist                                 PT Assessment/Plan       Row Name 25 0800          PT Assessment    Functional Limitations Decreased safety during functional activities;Impaired locomotion;Impaired gait;Performance in self-care ADL;Performance in work activities;Performance in leisure activities;Limitations in functional capacity and performance;Limitations in community activities  -MO     Impairments Balance;Pain;Gait;Range of motion;Poor body mechanics;Muscle strength;Motor function;Impaired flexibility;Posture  -MO      Assessment Comments Eric Lay was seen for 6 physical therapy sessions for L shoulder pain.  Treatment included therapeutic exercise, gait training, and patient education with home exercise program. Progress to physical therapy goals was good. Pt met 3/3 STG and 4/6 LTG. Pt has made significant improvement over the last several weeks up to this point. Notable improvement in ROM in all cardinal directions without onset of pain, now has normal range in all cardinal directions. Subjectively pt reports feeling much better, able to use his arm throughout the day with functional tasks and reach high into overhead cabinets without dysfunction. Subjective quickDASH improves from 81% disability to 9% this date. Time spent discussing advanced HEP and appropriate progressions/modifications to make based on response following discharge and optimal frequency/duration to complete HEP over next several weeks-months. Patient verbalized understanding. He was discharged to an independent HEP and provided patient education to self-manage condition.  -MO     Please refer to paper survey for additional self-reported information No  -MO     Rehab Potential Good  -MO     Patient/caregiver participated in establishment of treatment plan and goals Yes  -MO     Patient would benefit from skilled therapy intervention Yes  -MO        PT Plan    PT Plan Comments d/c  -MO               User Key  (r) = Recorded By, (t) = Taken By, (c) = Cosigned By      Initials Name Provider Type    Fe Singer, PT Physical Therapist                         OP Exercises       Row Name 04/01/25 0800             Subjective    Subjective Comments Have been doing really good  -MO         Total Minutes    72565 - PT Therapeutic Exercise Minutes 38  -MO         Exercise 2    Exercise Name 2 sleeper stretch  -MO      Cueing 2 Tactile;Verbal;Demo  -MO      Sets 2 5  -MO      Reps 2 10s  -MO         Exercise 3    Exercise Name 3 supine diagonal pulls  -MO       Cueing 3 Verbal  -MO      Sets 3 1  -MO      Reps 3 15  -MO      Time 3 GTB  -MO         Exercise 4    Exercise Name 4 beach chair stretch  -MO      Cueing 4 Verbal;Demo  -MO      Reps 4 4  -MO      Time 4 15s  -MO         Exercise 14    Exercise Name 14 supine skull   -MO      Cueing 14 Verbal  -MO      Sets 14 2  -MO      Reps 14 15  -MO      Time 14 6#  -MO                User Key  (r) = Recorded By, (t) = Taken By, (c) = Cosigned By      Initials Name Provider Type    Fe Singer, PT Physical Therapist                                    PT OP Goals       Row Name 04/01/25 0800          PT Short Term Goals    STG Date to Achieve 03/29/25  -MO     STG 1 Pt will be independent and verbalized good understanding of initial HEP to improve ability to manage pain, as well as improve strength and ROM.  -MO     STG 1 Progress Met  -MO     STG 2 Pt will improve supine L shoulder flexion to >/= 120 deg for improved ability to complete daily ADLs.  -MO     STG 2 Progress Met  -MO     STG 3 Pt will demo improved gait mechanics, ambulating with SPC in RUE, utilizing appropriate 2 point gait mechanics safely, to reduce strain throughout LUE.  -MO     STG 3 Progress Met  -MO        Long Term Goals    LTG Date to Achieve 04/28/25  -MO     LTG 1 Pt will be independent and verbalized good understanding of advanced HEP to improve ability to manage pain, as well as improve strength and ROM.  -MO     LTG 1 Progress Ongoing  -MO     LTG 2 Pt will report </= 3/10 pain in L shoulder with overhead activities to improve participation in ADLs.  -MO     LTG 2 Progress Met  -MO     LTG 3 Pt will improve L shoulder gross strength to at least 4+/5.  -MO     LTG 3 Progress Ongoing  -MO     LTG 4 Pt will improve active L shoulder flexion to >/= 150 deg for improved ease of ADL completion  -MO     LTG 4 Progress Met  -MO     LTG 5 Pt will improve DASH score to </= 45% perceived disability to show overall improved quality of life.  -MO      LTG 5 Progress Met  -MO     LTG 6 Pt will improve L shoulder function ER to >/= base of skull for improve ease of ADL completion.  -MO     LTG 6 Progress Met  -MO               User Key  (r) = Recorded By, (t) = Taken By, (c) = Cosigned By      Initials Name Provider Type    Fe Singer, PT Physical Therapist                    Therapy Education  Education Details: Full review of HEP. Educated pt on strength training guidelines with need to complete exercises 4x's per week. Discussed several ways to progress and modify each exercise as needed.  Given: HEP  Program: New, Reinforced  How Provided: Verbal, Written  Provided to: Patient  Level of Understanding: Verbalized    Quick DASH  Open a tight or new jar.: Mild Difficulty  Do heavy household chores (e.g., wash walls, wash floors): No Difficulty  Carry a shopping bag or briefcase: No Difficulty  Wash your back: Mild Difficulty  Use a knife to cut food: No Difficulty  Recreational activities in which you take some force or impact through your arm, should or hand (e.g. golf, hammering, tennis, etc.): No Difficulty  During the past week, to what extent has your arm, shoulder, or hand problem interfered with your normal social activites with family, friends, neighbors or groups?: Not at all  During the past week, were you limited in your work or other regular daily activities as a result of your arm, shoulder or hand problem?: Not limited at all  Arm, Shoulder, or hand pain: None  Tingling (pins and needles) in your arm, shoulder, or hand: Mild  During the past week, how much difficulty have you had sleeping because of the pain in your arm, shoulder or hand?: Mild Difficulty  Number of Questions Answered: 11  Quick DASH Score: 9.09         Time Calculation:   Start Time: 0815  Stop Time: 0853  Time Calculation (min): 38 min  Timed Charges  94697 - PT Therapeutic Exercise Minutes: 38  Total Minutes  Timed Charges Total Minutes: 38   Total Minutes: 38  Therapy  Charges for Today       Code Description Service Date Service Provider Modifiers Qty    25899769630 HC PT THER PROC EA 15 MIN 4/1/2025 Fe Maldonado, PT GP 3            PT G-Codes  Quick DASH Score: 9.09            Fe Maldonado, PT  4/1/2025

## 2025-04-03 ENCOUNTER — TELEPHONE (OUTPATIENT)
Age: 50
End: 2025-04-03
Payer: MEDICAID

## 2025-04-03 NOTE — TELEPHONE ENCOUNTER
Patient stated he quit taking the Pristiq because he is having some side effects one being he cannot have relations with his wife because that certain part will not allow him.

## 2025-04-04 RX ORDER — VILAZODONE HYDROCHLORIDE 10 MG/1
10 TABLET ORAL DAILY
Qty: 30 TABLET | Refills: 1 | Status: SHIPPED | OUTPATIENT
Start: 2025-04-04

## 2025-04-04 NOTE — TELEPHONE ENCOUNTER
Called and spoke with pt regarding his sexual side effects with the Pristiq. Pt has not taken Pristiq since Tuesday. Will discontinue Pristiq and start Viibyrd 10mg daily.  Sexual side effects are less likely to be experienced with Viibryd.  Discussed benefits versus risks of the medication.  Discussed side effects of the medication.

## 2025-04-08 ENCOUNTER — OFFICE VISIT (OUTPATIENT)
Dept: SLEEP MEDICINE | Facility: HOSPITAL | Age: 50
End: 2025-04-08
Payer: MEDICAID

## 2025-04-08 VITALS
HEART RATE: 89 BPM | HEIGHT: 74 IN | OXYGEN SATURATION: 96 % | DIASTOLIC BLOOD PRESSURE: 83 MMHG | BODY MASS INDEX: 27.85 KG/M2 | WEIGHT: 217 LBS | SYSTOLIC BLOOD PRESSURE: 133 MMHG

## 2025-04-08 DIAGNOSIS — F43.10 POST TRAUMATIC STRESS DISORDER (PTSD): ICD-10-CM

## 2025-04-08 DIAGNOSIS — G47.00 INSOMNIA, UNSPECIFIED TYPE: Primary | ICD-10-CM

## 2025-04-08 DIAGNOSIS — F17.219 CIGARETTE NICOTINE DEPENDENCE WITH NICOTINE-INDUCED DISORDER: ICD-10-CM

## 2025-04-08 DIAGNOSIS — F33.3 SEVERE EPISODE OF RECURRENT MAJOR DEPRESSIVE DISORDER, WITH PSYCHOTIC FEATURES: ICD-10-CM

## 2025-04-08 PROCEDURE — G0463 HOSPITAL OUTPT CLINIC VISIT: HCPCS

## 2025-04-08 RX ORDER — QUETIAPINE FUMARATE 50 MG/1
50 TABLET, FILM COATED ORAL NIGHTLY
Qty: 30 TABLET | Refills: 2 | Status: SHIPPED | OUTPATIENT
Start: 2025-04-08 | End: 2025-07-07

## 2025-04-08 NOTE — PROGRESS NOTES
[facility] SLEEP MEDICINE  [address]  [address]  [place] [address] [phone]    Referring Physician: Dr. Etienne  PCP: Jeffery Etienne MD    Reason for consult:  Sleep disorders of Insomnia    Eric Lay is a 49 y.o.male was [facility] in the [facility] today. Insomnia for entire life. He sleeps from 9pm on - average 3 hrs sleep without Ambien. Wakes up tired and unrefreshed. Has depression. Has thoughts running through his head. He was on Ambien for 20 yrs and recently discontinued. He follows with psychiatry. Currently on trazodone 150 mg qhs and helps but does not sufficiently work to keep him asleep. DENIES EDS but feels exhausted during day. Sweats excessively during sleep. He is on Norco for back pain.  He smokes 1ppd since age 13.  Shade Sleepiness Score: 0. Caffeine intake 0 per day. Alcohol intake 0 per week.    Eric Lay  has a past medical history of ADHD (attention deficit hyperactivity disorder), Anxiety, Arthritis, Asthma, Depression, Headache, Hyperlipidemia, Hypertension, Liver disease, Low back pain, Seizures, and Type 2 diabetes mellitus.     Current Medications:    Current Outpatient Medications:     Brexpiprazole (Rexulti) 0.5 MG tablet, Take 1 tablet by mouth once daily, Disp: 30 tablet, Rfl: 1    busPIRone (BUSPAR) 15 MG tablet, Take 1 tablet by mouth 2 (Two) Times a Day for 60 days., Disp: 60 tablet, Rfl: 1    ciclopirox (LOPROX) 0.77 % cream, Apply 1 Application topically to the appropriate area as directed 2 (Two) Times a Day., Disp: 30 g, Rfl: 0    HYDROcodone-acetaminophen (NORCO) 7.5-325 MG per tablet, Take 1 tablet by mouth 3 times a day., Disp: , Rfl:     meloxicam (MOBIC) 15 MG tablet, Take 1 tablet by mouth Daily., Disp: 90 tablet, Rfl: 1    naloxone (NARCAN) 4 MG/0.1ML nasal spray, Administer 1 spray into the nostril(s) as directed by provider As Needed., Disp: , Rfl:     QUEtiapine (SEROquel) 50 MG tablet, Take 1 tablet by  "mouth Every Night for 90 days., Disp: 30 tablet, Rfl: 2    traZODone (DESYREL) 150 MG tablet, Take 1 tablet by mouth every night at bedtime., Disp: 90 tablet, Rfl: 1    vilazodone (Viibryd) 10 MG tablet tablet, Take 1 tablet by mouth Daily., Disp: 30 tablet, Rfl: 1    zolpidem CR (AMBIEN CR) 12.5 MG CR tablet, Take 1 tablet by mouth every night at bedtime., Disp: 30 tablet, Rfl: 0   also listed in Sleep Questionnaire.    FH: family history includes Multiple myeloma in his sister. He was adopted.  SH:  reports that he has been smoking cigarettes. He has a 25 pack-year smoking history. He has never used smokeless tobacco. He reports current drug use. He reports that he does not drink alcohol.    Pertinent Positive Review of Systems of anxiety, depression  Rest of Review of Systems was negative as recorded in Sleep Questionnaire.        Vital Signs: /83   Pulse 89   Ht 188 cm (74.02\")   Wt 98.4 kg (217 lb)   SpO2 96%   BMI 27.85 kg/m²     Body mass index is 27.85 kg/m².       Tongue: Large       Dentition: good       Pharynx: Posterior pharyngeal pillars are wide   Mallampatti: II (hard and soft palate, upper portion of tonsils anduvula visible)        General: Alert. Cooperative. Well developed. No acute distress.             Head:  Normocephalic. Symmetrical. Atraumatic.              Eyes: Sclera clear. No icterus. PERRLA. Normal EOM.            Nose: No septal deviation. No drainage.          Throat: No oral lesions. No thrush. Moist mucous membranes.    Chest Wall:  Normal shape. Symmetric expansion with respiration. No tenderness.             Neck:  Trachea midline.           Lungs:  Clear to auscultation bilaterally.            Heart:  Regular rhythm and normal rate. Normal S1 and S2. No murmur.     Abdomen:  Soft, non-tender and non-distended. Normal bowel sounds. No masses.  Extremities:  Moves all extremities well. No edema.           Pulses: Pulses palpable and equal bilaterally.               " Skin: Dry. Intact. No bleeding. No rash.           Neuro: Moves all 4 extremities and cranial nerves grossly intact.  Psychiatric: Normal mood and affect.      Report:  No scans are attached to the encounter or orders placed in the encounter.      Impression:  1. Insomnia, unspecified type    2. Severe episode of recurrent major depressive disorder, with psychotic features    3. Post traumatic stress disorder (PTSD)    4. Cigarette nicotine dependence with nicotine-induced disorder          No orders of the defined types were placed in this encounter.    New Medications Ordered This Visit   Medications    QUEtiapine (SEROquel) 50 MG tablet     Sig: Take 1 tablet by mouth Every Night for 90 days.     Dispense:  30 tablet     Refill:  2         Plan:  Eric has primary complaint of longstanding insomnia. Denies EDS. Currently on trazodone 150 mg qhs. Previously on Ambien CR that worked variably. Will try quetiapine 50 mg qhs.  I asked him to stop the trazodone and not use any other sleep aid.  I have checked the interaction of Rexulti with Seroquel and not significant.  In view of smoking history I will check DEANGELO on RA.  If he desaturates significantly then he may need a sleep study versus evaluation for COPD.  I asked him to consider low-dose screening CT for lung cancer.    Patient will follow up in this clinic 2 months with Dr. Riley    Thank you for allowing me to participate in your patient's care.    Electronically signed by Clyde Riley MD, 04/08/25, 10:09 AM EDT.    Part of this note may be an electronic transcription/translation of spoken language to printed text using the Dragon Dictation System.

## 2025-04-09 ENCOUNTER — OFFICE VISIT (OUTPATIENT)
Dept: FAMILY MEDICINE CLINIC | Facility: CLINIC | Age: 50
End: 2025-04-09
Payer: MEDICAID

## 2025-04-09 VITALS
SYSTOLIC BLOOD PRESSURE: 150 MMHG | WEIGHT: 219 LBS | HEART RATE: 89 BPM | OXYGEN SATURATION: 99 % | TEMPERATURE: 98 F | BODY MASS INDEX: 28.11 KG/M2 | HEIGHT: 74 IN | DIASTOLIC BLOOD PRESSURE: 96 MMHG

## 2025-04-09 DIAGNOSIS — F32.A DEPRESSION, UNSPECIFIED DEPRESSION TYPE: ICD-10-CM

## 2025-04-09 DIAGNOSIS — F51.01 PRIMARY INSOMNIA: ICD-10-CM

## 2025-04-09 DIAGNOSIS — F41.9 ANXIETY: ICD-10-CM

## 2025-04-09 PROCEDURE — 1160F RVW MEDS BY RX/DR IN RCRD: CPT | Performed by: FAMILY MEDICINE

## 2025-04-09 PROCEDURE — 99214 OFFICE O/P EST MOD 30 MIN: CPT | Performed by: FAMILY MEDICINE

## 2025-04-09 PROCEDURE — 1125F AMNT PAIN NOTED PAIN PRSNT: CPT | Performed by: FAMILY MEDICINE

## 2025-04-09 PROCEDURE — 1159F MED LIST DOCD IN RCRD: CPT | Performed by: FAMILY MEDICINE

## 2025-04-09 RX ORDER — TRAZODONE HYDROCHLORIDE 150 MG/1
TABLET ORAL
Start: 2025-04-09 | End: 2025-04-23

## 2025-04-09 NOTE — PROGRESS NOTES
"  Subjective   Eric Lay is a 49 y.o. male who is here for   Chief Complaint   Patient presents with    Insomnia   .     Insomnia  Symptoms are chronic.   Onset was more than 5 years.   Symptoms occur constantly.   Pertinent negative symptoms include no chest pain, no cough and no rash.   Aggravating factors include nothing.   Treatments tried: Ambien, trazodone.   Improvement on treatment was mild.      History of Present Illness      Review of Systems   Constitutional:  Negative for activity change and appetite change.   Respiratory:  Negative for cough and shortness of breath.    Cardiovascular:  Negative for chest pain and leg swelling.   Skin:  Negative for color change and rash.   Psychiatric/Behavioral:  The patient has insomnia.        Objective   Vitals:    04/09/25 1047   BP: 150/96   Pulse: 89   Temp: 98 °F (36.7 °C)   SpO2: 99%   Weight: 99.3 kg (219 lb)   Height: 188 cm (74.02\")      Physical Exam  Vitals and nursing note reviewed.   Constitutional:       Appearance: Normal appearance. He is normal weight.   HENT:      Head: Normocephalic and atraumatic.   Cardiovascular:      Rate and Rhythm: Normal rate and regular rhythm.      Pulses: Normal pulses.      Heart sounds: No murmur heard.  Pulmonary:      Effort: Pulmonary effort is normal. No respiratory distress.      Breath sounds: Normal breath sounds. No wheezing.   Skin:     General: Skin is warm and dry.   Neurological:      General: No focal deficit present.      Mental Status: He is alert.   Psychiatric:         Mood and Affect: Mood normal.         Thought Content: Thought content normal.       Physical Exam        Assessment & Plan   Assessment & Plan    Diagnoses and all orders for this visit:    1. Primary insomnia  Will wean off trazodone.  Patient is to decrease to half a tablet daily x 1 week and then decrease to quarter of a tablet x 1 week then stop.  Patient is to continue to follow-up with sleep medicine.  They are starting him " on Seroquel.  Discontinue Ambien  -     traZODone (DESYREL) 150 MG tablet; Take 0.5 tablet x 1 week then decrease too 0.25 tablet x 1 week then stop    2. Depression, unspecified depression type  Will wean off trazodone.  Patient is to decrease to half a tablet daily x 1 week and then decrease to quarter of a tablet x 1 week then stop.  Patient is to continue to follow-up with sleep medicine.  They are starting him on Seroquel.  -     traZODone (DESYREL) 150 MG tablet; Take 0.5 tablet x 1 week then decrease too 0.25 tablet x 1 week then stop    3. Anxiety      Will wean off trazodone.  Patient is to decrease to half a tablet daily x 1 week and then decrease to quarter of a tablet x 1 week then stop.  Patient is to continue to follow-up with sleep medicine.  They are starting him on Seroquel.  -     traZODone (DESYREL) 150 MG tablet; Take 0.5 tablet x 1 week then decrease too 0.25 tablet x 1 week then stop      Results      There are no Patient Instructions on file for this visit.    Medications Discontinued During This Encounter   Medication Reason    zolpidem CR (AMBIEN CR) 12.5 MG CR tablet Historical Med - Therapy completed    traZODone (DESYREL) 150 MG tablet         Return in about 3 months (around 7/9/2025).             Jeffery Etienne MD  [place], [place]

## 2025-04-22 ENCOUNTER — OFFICE VISIT (OUTPATIENT)
Age: 50
End: 2025-04-22
Payer: MEDICAID

## 2025-04-22 VITALS
SYSTOLIC BLOOD PRESSURE: 139 MMHG | BODY MASS INDEX: 28.35 KG/M2 | HEART RATE: 96 BPM | WEIGHT: 220.9 LBS | DIASTOLIC BLOOD PRESSURE: 90 MMHG | HEIGHT: 74 IN | OXYGEN SATURATION: 98 %

## 2025-04-22 DIAGNOSIS — F43.10 POST TRAUMATIC STRESS DISORDER (PTSD): ICD-10-CM

## 2025-04-22 DIAGNOSIS — G47.9 SLEEP DISTURBANCE: Primary | ICD-10-CM

## 2025-04-22 DIAGNOSIS — F33.3 SEVERE EPISODE OF RECURRENT MAJOR DEPRESSIVE DISORDER, WITH PSYCHOTIC FEATURES: ICD-10-CM

## 2025-04-22 RX ORDER — VILAZODONE HYDROCHLORIDE 20 MG/1
20 TABLET ORAL DAILY
Qty: 30 TABLET | Refills: 1 | Status: SHIPPED | OUTPATIENT
Start: 2025-04-22 | End: 2025-06-21

## 2025-04-22 NOTE — PROGRESS NOTES
"     Office  Follow Up Visit      Patient Name: Eric Lay  : 1975   MRN: [id number]     Referring Provider: Jeffery Etienne MD    Chief Complaint:  \"I am not sleeping\"    ICD-10-CM ICD-9-CM   1. Sleep disturbance  G47.9 780.50   2. Severe episode of recurrent major depressive disorder, with psychotic features  F33.3 296.34   3. Post traumatic stress disorder (PTSD)  F43.10 309.81      History of Present Illness:   Eric Lay is a 49 y.o. male who is here today for follow up. Patient was seen for initial evaluation on 2025.  Patient reported a history of PTSD, depression, and anxiety.  Reported that he has been dealing with anxiety, depression, and trauma related symptoms majority of his life.  Patient also reported that he has had difficulty with memory for a little over a year without any known cause (patient did talk to his neurologist about this).  Patient started seeing neurology after experiencing seizure-like episodes that started about a year ago.  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  Patient came to provider taking Zoloft 25 mg daily, trazodone 150 mg nightly, Ambien 12.5 mg nightly, and BuSpar 15 mg twice daily.  Patient has tried to increase Zoloft to 50 mg daily but experienced sexual side effects.  During initial evaluation patient was diagnosed with MDD, PTSD, and DILLON with panic attacks.  Discussed with patient that his memory issues may be related to his depression.  Discussed with patient that we will focus on treating patient's depression and trauma related symptoms which will ultimately hopefully help patient's memory and nonepileptic seizures.  Patient did report during this evaluation that he does experience some vague auditory hallucinations which may be related to his depression.  Patient was started on Rexulti 0.5 mg daily to help with depressive symptoms and trauma related symptoms. " " Patient was continued on Zoloft 25 mg daily, Ambien 12.5 mg nightly, trazodone 150 mg nightly, and BuSpar 15 mg twice daily.  Notify patient that we would most likely change his antidepressant during next visit.   2/21/2025: Today patient reports some improvement in anxiety, depression, and trauma-related symptoms with the added Rexulti but patient's PHQ-9 and DILLON-7 scores are still very elevated today.  Patient continues to have episodes of \"spacing out\" which neurology believes are non epileptic seizures.  Would like to try patient on a different antidepressant to help with anxiety, depressive symptoms, and trauma related symptoms.  Patient did experience side effects with increasing the Zoloft to 50 mg daily, so will cross-taper patient from Zoloft to Prozac 20 mg daily. Encourage patient to continue with therapy.  Will see patient in 4 weeks.  Will continue Rexulti 0.5 mg daily and BuSpar 15 mg twice daily.  Discussed with patient that we will continue to try to treat his anxiety, depression, and trauma related symptoms which will hopefully ultimately treat his nonepileptic seizures and memory issues.  Safety plan reviewed with the patient can be found in patient's chart.  Referral placed to sleep medicine to rule out sleep apnea.   3/20/25: Today patient reports to still be struggling with anxiety, depressive symptoms, and trauma related symptoms.  Patient's PHQ-9 and DILLON-7 scores are elevated today.  States he has not noticed any improvement with the Prozac and is experiencing sexual side effects with Prozac.  Will switch patient to an antidepressant that is less likely to cause sexual side effects.  Will discontinue Prozac and start patient on Pristiq 50 mg daily.  Hopefully Pristiq will help with some of patient's fatigue and lack of motivation depressive type symptoms.  Pristiq should also help with patient's anxiety and trauma related symptoms. Will continue Rexulti 0.5 mg daily, BuSpar 15 mg twice daily, " "trazodone 150 mg nightly, and Ambien 12.5 mg nightly as needed for sleep for now.  Safety plan completed with patient and can be found in patient's chart.  Hopefully patient's memory improves as his trauma symptoms and depressive symptoms improve but would like patient to reach back out to neurology for further dementia evaluation or neuropsych testing to rule out dementia.  Patient will be seeing sleep medicine in April to rule out sleep apnea.   4/3/25 (Patient call): Called and spoke with pt regarding his sexual side effects with the Pristiq. Pt has not taken Pristiq since Tuesday. Will discontinue Pristiq and start Viibyrd 10mg daily.  Sexual side effects are less likely to be experienced with Viibryd.  Discussed benefits versus risks of the medication.  Discussed side effects of the medication.     Today pt is currently taking Viibryd 10mg daily, Rexulti 0.5 mg daily, BuSpar 15 mg twice daily, trazodone 150 mg nightly, and Ambien 12.5 mg nightly as needed for sleep for now. Pt states today that his PCP discontinued his Ambien because they did not want him to be on it anymore. Reports that he saw sleep medicine and they advised him to stop taking trazodone, ordered a sleep study, and added seroquel 50mg nightly to use as a sleep aid.  Patient reports that he is very frustrated that his Ambien was taken away.  With Ambien patient was sleeping at best 4 hours each night.  Patient states that now with just Seroquel he is sleeping about 3 hours each night.  Patient admits today that he has been taking \"about 4 tablets of Benadryl nightly\" for years.  Reports to still feel depressed and anxious nearly every day.  Patient feels that he is depressed and anxious because his quality life is poor and he is not sleeping well.  States that he did complete a sleep study a few days ago and is waiting on the results.  Reports to still have passive thoughts of death but denies any intent or plan to harm himself.  Patient " adamantly denies SI today.  Denies side effects of medications.  No change in appetite.  Denies SI/HI/AVH.    Subjective      Review of Systems:   Review of Systems   Respiratory:  Negative for chest tightness and shortness of breath.    Gastrointestinal:  Negative for abdominal pain, constipation, diarrhea, nausea and vomiting.   Genitourinary:  Negative for difficulty urinating.   Musculoskeletal:         Chronic pain   Neurological:  Negative for headaches.   Psychiatric/Behavioral:  Positive for sleep disturbance. Negative for hallucinations and suicidal ideas. The patient is nervous/anxious.         Depressed       PHQ-9 Depression Screening  Little interest or pleasure in doing things? More than half the days   Feeling down, depressed, or hopeless? Nearly every day   PHQ-2 Total Score 5   Trouble falling or staying asleep, or sleeping too much? Nearly every day   Feeling tired or having little energy? Nearly every day   Poor appetite or overeating? Nearly every day   Feeling bad about yourself - or that you are a failure or have let yourself or your family down? Nearly every day   Trouble concentrating on things, such as reading the newspaper or watching television? Nearly every day   Moving or speaking so slowly that other people could have noticed? Or the opposite - being so fidgety or restless that you have been moving around a lot more than usual? Nearly every day     Thoughts that you would be better off dead, or of hurting yourself in some way? More than half the days   PHQ-9 Total Score 25   If you checked off any problems, how difficult have these problems made it for you to do your work, take care of things at home, or get along with other people? Extremely difficult       DILLON-7      Over the last two weeks, how often have you been bothered by the following problems?  Feeling nervous, anxious or on edge: Nearly every day  Not being able to stop or control worrying: Nearly every day  Worrying too much  about different things: Nearly every day  Trouble Relaxing: Nearly every day  Being so restless that it is hard to sit still: Nearly every day  Becoming easily annoyed or irritable: Nearly every day  Feeling afraid as if something awful might happen: Nearly every day  DILLON 7 Total Score: 21  If you checked any problems, how difficult have these problems made it for you to do your work, take care of things at home, or get along with other people: Extremely difficult    Patient History:   The following portions of the patient's history were reviewed and updated as appropriate: allergies, current medications, past family history, past medical history, past social history, past surgical history and problem list.     Social History     Socioeconomic History    Marital status:      Spouse name: Angelita    Years of education: High school   Tobacco Use    Smoking status: Every Day     Current packs/day: 1.00     Average packs/day: 1 pack/day for 25.0 years (25.0 ttl pk-yrs)     Types: Cigarettes    Smokeless tobacco: Never   Vaping Use    Vaping status: Never Used   Substance and Sexual Activity    Alcohol use: Never    Drug use: Not Currently     Types: Marijuana    Sexual activity: Yes     Partners: Female     Birth control/protection: Post-menopausal, None     Comment:      Medications:     Current Outpatient Medications:     busPIRone (BUSPAR) 15 MG tablet, Take 1 tablet by mouth 2 (Two) Times a Day for 60 days., Disp: 60 tablet, Rfl: 1    ciclopirox (LOPROX) 0.77 % cream, Apply 1 Application topically to the appropriate area as directed 2 (Two) Times a Day., Disp: 30 g, Rfl: 0    HYDROcodone-acetaminophen (NORCO) 7.5-325 MG per tablet, Take 1 tablet by mouth 3 times a day., Disp: , Rfl:     meloxicam (MOBIC) 15 MG tablet, Take 1 tablet by mouth Daily., Disp: 90 tablet, Rfl: 1    naloxone (NARCAN) 4 MG/0.1ML nasal spray, Administer 1 spray into the nostril(s) as directed by provider As Needed., Disp: , Rfl:  "    QUEtiapine (SEROquel) 50 MG tablet, Take 1 tablet by mouth Every Night for 90 days., Disp: 30 tablet, Rfl: 2    vilazodone (Viibryd) 20 MG tablet tablet, Take 1 tablet by mouth Daily for 60 days., Disp: 30 tablet, Rfl: 1    Objective     Physical Exam:  Vital Signs:   Vitals:    04/22/25 0959   BP: 139/90   Pulse: 96   SpO2: 98%   Weight: 100 kg (220 lb 14.4 oz)   Height: 188 cm (74.02\")     Body mass index is 28.35 kg/m².     Mental Status Exam:   MENTAL STATUS EXAM   General Appearance:  Cleanly groomed and dressed  Eye Contact:  Good eye contact  Attitude:  Cooperative  Motor Activity:  Normal gait, posture  Muscle Strength:  Normal  Speech:  Normal rate, tone, volume  Language:  Spontaneous  Mood and affect:  Frustrated  Hopelessness:  Denies  Loneliness: Denies  Thought Process:  Logical  Associations/ Thought Content:  No delusions  Hallucinations:  None  Suicidal Ideations:  Not present  Homicidal Ideation:  Not present  Sensorium:  Alert and clear  Orientation:  Person, place, time and situation  Attention Span/ Concentration:  Good  Fund of Knowledge:  Appropriate for age and educational level  Intellectual Functioning:  Average range  Insight:  Fair  Judgement:  Good  Reliability:  Good  Impulse Control:  Good       @RESULASTCBCDIFFPANEL,TSH,LABLIPI,CRXNUXEQ30,XOOOMVLY48,MG,FOLATE,PROLACTIN,CRPRESULT,CMP,[id number])@    Lab Results   Component Value Date    GLUCOSE 93 01/29/2025    BUN 19 01/29/2025    CREATININE 1.33 (H) 01/29/2025    EGFR 64.7 01/29/2025    BCR 14.3 01/29/2025    K 4.8 01/29/2025    CO2 22.6 01/29/2025    CALCIUM 9.8 01/29/2025    ALBUMIN 4.4 01/29/2025    BILITOT 0.5 01/29/2025    AST 19 01/29/2025    ALT 34 01/29/2025       Lab Results   Component Value Date    WBC 11.26 (H) 01/29/2025    HGB 17.6 01/29/2025    HCT 52.5 (H) 01/29/2025    MCV 92.1 01/29/2025     01/29/2025       Lab Results   Component Value Date    CHLPL 216 (H) 01/29/2025    TRIG 111 01/29/2025    HDL 38 " (L) 01/29/2025     (H) 01/29/2025       TSH          1/29/2025    08:35   TSH   TSH 2.510           Assessment / Plan      Assessment:   Patient is a 49-year-old male.  Patient was last seen in clinic on 3/20/2025 when he reported to still be struggling with anxiety and depressive symptoms.  Patient reported that he was experiencing sexual side effects with the Prozac, so patient was switched to Pristiq 50 mg daily to hopefully help with anxiety, depressive symptoms, and trauma-related symptoms without causing sexual side effects.  Patient is continued on Rexulti 0.5 mg daily, BuSpar 15 mg twice daily, trazodone 150 mg nightly, and Ambien 12.5 mg nightly as needed for sleep for now.  Patient stated he was waiting for his appointment in April for sleep medicine.  Patient called office on 4/3/2025 and reported that he was experiencing sexual side effects with Pristiq.  Patient was switched from Pristiq to Viibryd to hopefully have less sexual side effects but also treat his symptoms.  Today patient reports to still be struggling with anxiety and depression.  Patient's Ambien was discontinued by PCP.  Patient did see sleep medicine several weeks ago and was advised to stop all sleep aids and trazodone.  Sleep study was completed but has not resulted yet.  Sleep medicine added Seroquel 50 mg daily.  Today patient seems very frustrated that his Ambien was taken away.  Patient also admits that he is still taking 4 tablets of Benadryl nightly and has been for years and is unsure of what dose he is really taking.  Discussed with patient that sleep medicine advised for him to stop taking all sleep aids other than the Seroquel that was prescribed.  Encouraged patient to be compliant with sleep medicine's plan for him.  Will discontinue Rexulti so that sleep medicine can better optimize their Seroquel dose if the need to.  Will increase patient's Viibryd to 20 mg daily to further improve anxiety, trauma-related  symptoms, and depressive symptoms.  Patient's anxiety and depressive symptoms seem to be primarily related to his lack of quality of life with his physical//medical issues and sleep problems.  With all the changes in patient's sleep medications, he is only sleeping 1 hour less than he usually would with all the sleep aids.  Encourage patient to continue with therapy but patient states that he will not be continuing therapy in clinic.  Discussed with patient that he needs to be compliant with sleep medicine and therapy to remain a patient here as his problems with anxiety/depression are unlikely to be successfully treated with medication alone.  Will see patient in 4 weeks but offered patient a list of other behavioral health clinics to get a second opinion if he wishes to but patient declined to take the list of referrals.  Safety plan reviewed with patient and can be found below.    Diagnoses and all orders for this visit:    1. Sleep disturbance (Primary)    2. Severe episode of recurrent major depressive disorder, with psychotic features    3. Post traumatic stress disorder (PTSD)    Other orders  -     vilazodone (Viibryd) 20 MG tablet tablet; Take 1 tablet by mouth Daily for 60 days.  Dispense: 30 tablet; Refill: 1       Plan:   - Will increase Viibryd to 20 mg daily to further improve anxiety, depressive symptoms, and trauma-related symptoms.  - Will discontinue Rexulti to allow sleep medicine to better optimize his Seroquel dose if they need to.  - Encouraged patient to be compliant with all sleep medicines recommendations.  Discussed with patient that sleep medicine advised him to stop taking any sleep aids other than what was prescribed.  Encouraged patient to stop using Benadryl nightly for sleep.  - Will continue BuSpar 15 mg twice daily.  - Continue with therapy in clinic.  - Discussed medication options and treatment plan of prescribed medication as well as the risks, benefits, and side effects   -  Encouraged pt to continue supportive psychotherapy efforts and medications as indicated.  - Educated pt on signs and symptoms of serotonin syndrome and notified pt to go to the ER if experiencing these symptoms.   - Notified pt that antidepressants can sometimes cause worsening SI and to monitor for this.   - Notified pt that antipsychotics can increase cholesterol levels, blood sugar, wt, and BP.   - Educated pt on EPS/TD and to notify provider is they experience these symptoms.       Short-term goals: Continue to develop rapport with patient.     Long-term goals: Symptom reduction with medication and therapy.     Weakness: Tobacco use.     Strengths: Great support from wife.    Continue supportive psychotherapy efforts and medications as indicated. Treatment and medication options discussed during today's visit. Patient ackowledged and verbally consented to continue with current treatment plan and was educated on the importance of compliance with treatment and follow-up appointments. Patient seems reasonably able to adhere to treatment plan.      Medication Considerations:  Discussed medication options and treatment plan of prescribed medication(s) as well as the risks, benefits, and potential side effects. Patient is agreeable to call the office with any worsening of symptoms or onset of side effects. Patient is agreeable to call 911 or go to the nearest ER should he/she begin having SI/HI.    Quality Measures:   Patient does smoke 1 pack a day. Provided education on smoking cessation and risk of using tobacco products.    Dread reviewed and is appropriate.    Follow Up:   Return in about 4 weeks (around 5/20/2025) for Recheck.    Copied text in this note has been reviewed and is accurate as of 4/22/2025.    Step 1: Warning signs:  Warning Signs   Negative thoughts      Step 2: Internal coping strategies - Things I can do to take my mind off my problems without contacting another person:  Strategies   Smoke a  cigarette.      Step 3: People and social settings that provide distraction:  Name Contact Information   Angelita Lay          Step 4: People whom I can ask for help during a crisis:  Name Contact Information   Angelita Lay       Step 5: Professionals or agencies I can contact during a crisis:  Clinician/Agency Name Phone Emergency Contact   suicide Hotline     [facility]        Suicide Prevention Lifeline Phone: Call or Text 007  Crisis Text Line: Text HOME to 610006     Step 6: Making the environment safer (plan for lethal means safety):  Did not identify any lethal methods     Optional: What is most important to me and worth living for?:  Not being invisible to family    Part of this note may be an electronic transcription/translation of spoken language to printed text using the Dragon Dictation System.    PATRICIA Covington, PMHNP-BC

## 2025-04-23 ENCOUNTER — DOCUMENTATION (OUTPATIENT)
Dept: SLEEP MEDICINE | Facility: HOSPITAL | Age: 50
End: 2025-04-23
Payer: MEDICAID

## 2025-04-23 ENCOUNTER — TELEPHONE (OUTPATIENT)
Dept: SLEEP MEDICINE | Facility: HOSPITAL | Age: 50
End: 2025-04-23
Payer: MEDICAID

## 2025-04-23 DIAGNOSIS — G47.33 OSA (OBSTRUCTIVE SLEEP APNEA): ICD-10-CM

## 2025-04-23 DIAGNOSIS — G47.34 NOCTURNAL HYPOXIA: Primary | ICD-10-CM

## 2025-04-23 DIAGNOSIS — G47.00 INSOMNIA, UNSPECIFIED TYPE: ICD-10-CM

## 2025-04-23 DIAGNOSIS — G47.30 SLEEP-DISORDERED BREATHING: ICD-10-CM

## 2025-04-23 NOTE — PROGRESS NOTES
Overnight oximetry on RA 4/14/25 shows desaturation to <=88% for 2 hours and 3 minutes. Lowest desaturation 75%. Staff was asked to notify patient. I recommend in lab split night PSG. He will be asked to bring sleep aid he is currently on to the sleep center and take at lights out.     If study shows no sleep disorder breathing, he will need to have further testing in our pulmonary clinic for COPD with PFT.

## 2025-04-25 RX ORDER — QUETIAPINE FUMARATE 50 MG/1
100 TABLET, FILM COATED ORAL NIGHTLY
Qty: 60 TABLET | Refills: 2 | Status: SHIPPED | OUTPATIENT
Start: 2025-04-25 | End: 2025-07-24

## 2025-05-02 ENCOUNTER — TELEPHONE (OUTPATIENT)
Age: 50
End: 2025-05-02
Payer: MEDICAID

## 2025-05-28 RX ORDER — BREXPIPRAZOLE 0.5 MG/1
1 TABLET ORAL DAILY
Qty: 30 TABLET | Refills: 0 | OUTPATIENT
Start: 2025-05-28

## 2025-05-28 NOTE — TELEPHONE ENCOUNTER
I called and spoke with Nhe at patient's pharmacy and he states that patient called and requested a refill,however, as per his last office visit note with Jeanette Ty was being discontinued.    I called patient and he said he did not know, his wife manages all his medications and he thought he was still taking it, but he would check with her and call me back.   Detail Level: Simple Size Of Lesion In Cm (Optional): 0

## 2025-05-28 NOTE — TELEPHONE ENCOUNTER
Patient just returned my call and yes, he has been taking, he was not aware he was to stop, but will discontinue now.    Script will be denied

## 2025-06-04 ENCOUNTER — OFFICE VISIT (OUTPATIENT)
Age: 50
End: 2025-06-04
Payer: MEDICAID

## 2025-06-04 ENCOUNTER — TELEPHONE (OUTPATIENT)
Age: 50
End: 2025-06-04

## 2025-06-04 VITALS
WEIGHT: 220.2 LBS | DIASTOLIC BLOOD PRESSURE: 92 MMHG | BODY MASS INDEX: 28.26 KG/M2 | HEIGHT: 74 IN | HEART RATE: 89 BPM | SYSTOLIC BLOOD PRESSURE: 133 MMHG | OXYGEN SATURATION: 97 %

## 2025-06-04 DIAGNOSIS — F43.10 POST TRAUMATIC STRESS DISORDER (PTSD): ICD-10-CM

## 2025-06-04 DIAGNOSIS — F41.1 GENERALIZED ANXIETY DISORDER: ICD-10-CM

## 2025-06-04 DIAGNOSIS — F33.3 SEVERE EPISODE OF RECURRENT MAJOR DEPRESSIVE DISORDER, WITH PSYCHOTIC FEATURES: Primary | ICD-10-CM

## 2025-06-04 DIAGNOSIS — F41.1 GENERALIZED ANXIETY DISORDER WITH PANIC ATTACKS: ICD-10-CM

## 2025-06-04 DIAGNOSIS — F41.0 GENERALIZED ANXIETY DISORDER WITH PANIC ATTACKS: ICD-10-CM

## 2025-06-04 DIAGNOSIS — F33.1 MDD (MAJOR DEPRESSIVE DISORDER), RECURRENT EPISODE, MODERATE: Primary | ICD-10-CM

## 2025-06-04 RX ORDER — BUSPIRONE HYDROCHLORIDE 10 MG/1
20 TABLET ORAL 2 TIMES DAILY
Qty: 120 TABLET | Refills: 1 | Status: SHIPPED | OUTPATIENT
Start: 2025-06-04 | End: 2025-08-03

## 2025-06-04 NOTE — PROGRESS NOTES
[facility]      Follow Up Adult Note     Date:2025   Client Name: Eric Lay  : 1975   MRN: [id number]   Time IN: 11:10 AM    Time OUT: 12:04 PM     Referring Provider: Jeffery Etienne MD    Chief Complaint:      ICD-10-CM ICD-9-CM   1. Severe episode of recurrent major depressive disorder, with psychotic features  F33.3 296.34   2. Post traumatic stress disorder (PTSD)  F43.10 309.81   3. Generalized anxiety disorder  F41.1 300.02        History of Present Illness:   Eric Lay is a 49 y.o. male who is being seen today for follow up individual psychotherapy counseling for depression and anxiety.        Objective     PHQ-9 Depression Screening  Little interest or pleasure in doing things? Over half   Feeling down, depressed, or hopeless? Over half   PHQ-2 Total Score 4   Trouble falling or staying asleep, or sleeping too much? Almost all   Feeling tired or having little energy? Almost all   Poor appetite or overeating? Almost all   Feeling bad about yourself - or that you are a failure or have let yourself or your family down? Almost all   Trouble concentrating on things, such as reading the newspaper or watching television? Almost all   Moving or speaking so slowly that other people could have noticed? Or the opposite - being so fidgety or restless that you have been moving around a lot more than usual? Over half   Thoughts that you would be better off dead, or of hurting yourself in some way? Over half   PHQ-9 Total Score 23   If you checked off any problems, how difficult have these problems made it for you to do your work, take care of things at home, or get along with other people? Very difficult        DILLON-7  Feeling nervous, anxious or on edge: Nearly every day  Not being able to stop or control worrying: Nearly every day  Worrying too much about different things: Nearly every day  Trouble Relaxing: Nearly every day  Being so restless that it is hard  to sit still: More than half the days  Feeling afraid as if something awful might happen: Nearly every day  Becoming easily annoyed or irritable: Nearly every day  DILLON 7 Total Score: 20  If you checked any problems, how difficult have these problems made it for you to do your work, take care of things at home, or get along with other people: Very difficult      Medications:     Current Outpatient Medications:     busPIRone (BUSPAR) 15 MG tablet, Take 1 tablet by mouth 2 (Two) Times a Day for 60 days., Disp: 60 tablet, Rfl: 1    ciclopirox (LOPROX) 0.77 % cream, Apply 1 Application topically to the appropriate area as directed 2 (Two) Times a Day., Disp: 30 g, Rfl: 0    HYDROcodone-acetaminophen (NORCO) 7.5-325 MG per tablet, Take 1 tablet by mouth 3 times a day., Disp: , Rfl:     meloxicam (MOBIC) 15 MG tablet, Take 1 tablet by mouth Daily., Disp: 90 tablet, Rfl: 1    naloxone (NARCAN) 4 MG/0.1ML nasal spray, Administer 1 spray into the nostril(s) as directed by provider As Needed., Disp: , Rfl:     QUEtiapine (SEROquel) 50 MG tablet, Take 2 tablets by mouth Every Night for 90 days., Disp: 60 tablet, Rfl: 2    vilazodone (Viibryd) 20 MG tablet tablet, Take 1 tablet by mouth Daily for 60 days., Disp: 30 tablet, Rfl: 1    Allergies:   Allergies   Allergen Reactions    Bee Venom Hives    Cat Dander Itching    Dust Mite Extract Itching         Subjective     Mental Status Exam:   MENTAL STATUS EXAM   General Appearance:  Cleanly groomed and dressed  Eye Contact:  Good eye contact  Attitude:  Cooperative  Motor Activity:  Normal gait, posture  Muscle Strength:  Normal  Speech:  Normal rate, tone, volume  Language:  Stereotypical and unspontaneous  Mood and affect:  Depressed  Thought Process:  Logical and goal-directed  Associations/ Thought Content:  No delusions  Hallucinations:  None  Suicidal Ideations:  Not present  Homicidal Ideation:  Not present  Sensorium:  Alert and clear  Orientation:  Person, time, place and  situation  Immediate Recall, Recent, and Remote Memory:  Intact  Attention Span/ Concentration:  Good  Fund of Knowledge:  Appropriate for age and educational level  Intellectual Functioning:  Average range  Insight:  Good  Judgement:  Good  Reliability:  Good  Impulse Control:  Good       Client's Support Network Includes:  wife    Functional Status: No impairment    Progress toward goal: Not at goal    Prognosis: Guarded with Ongoing Treatment    Assessment / Plan / Progress     Visit Diagnosis/Orders Placed This Visit:    ICD-10-CM ICD-9-CM   1. Severe episode of recurrent major depressive disorder, with psychotic features  F33.3 296.34   2. Post traumatic stress disorder (PTSD)  F43.10 309.81   3. Generalized anxiety disorder  F41.1 300.02        SUICIDE RISK ASSESSMENT/CSSRS:  1. Does client have thoughts of suicide? Patient denies  2. Does client have intent for suicide? Patient denies  3. Does client have a current plan for suicide? Patient denies   4. History of suicide attempts: Yes   5. Family history of suicide or attempts: Patient denies   6. History of violent behaviors towards others or property or thoughts of committing suicide: Yes   7. History of sexual aggression toward others: Patient denies   8. Access to firearms or weapons: Patient denies     PLAN:  Safety: No acute safety concerns  Risk Assessment: Risk of self-harm acutely is low. Risk of self-harm chronically is also low, but could be further elevated in the event of treatment noncompliance and/or AODA.    Presenting Problem: Client reported that he is feeling alright. He reported that the Seroquel is not helping and he is getting 3 hours of sleep. He reported that he has had diarrhea for a month and he is still having sexual side effects. He reported that he injured his shoulder from falling. He reported that not being able to do anything and help out his contributing to his depression. He reported that being around his dog Val is the  most enjoyable part of his day. He reported that he turned in his disability paperwork a few months ago and he is still waiting to hear back from the state.     Treatment Plan/Goals & Progress: Continue supportive psychotherapy efforts and medications as indicated. Treatment options discussed during today's visit. Client ackowledged and verbally consented to continue with current treatment plan and was educated on the importance of compliance with treatment and follow-up appointments. Client seems reasonably able to adhere to treatment plan.      Assisted client in processing above session content; acknowledged and normalized client’s thoughts, feelings, and concerns.  Rationalized client's thought process regarding depression, anxiety, and PTSD.      Allowed client to freely discuss issues without interruption or judgement with unconditional positive regard, active listening skills, and empathy. Clinician provided a safe, confidential environment to facilitate the development of a positive therapeutic relationship and encouraged open, honest communication. Assisted client in identifying risk factors which would indicate the need for higher level of care including thoughts to harm self or others and/or self-harming behavior and encouraged client to contact this office, call 911, or present to the nearest emergency room should any of these events occur. Discussed crisis intervention services and means to access. Client adamantly and convincingly denies current suicidal or homicidal ideation or perceptual disturbance. Assisted client in processing session content; acknowledged and normalized client’s thoughts, feelings, and concerns by utilizing a person-centered approach in efforts to build appropriate rapport and a positive therapeutic relationship with open and honest communication.       Follow Up:   Return if symptoms worsen or fail to improve.    Juli Lancaster, [facility]  [facility]

## 2025-06-04 NOTE — TELEPHONE ENCOUNTER
Spoke with patient and relayed Jeanette's message , also sent to him thru mychart so he would have in writing- patient voiced understanding

## 2025-06-04 NOTE — TELEPHONE ENCOUNTER
Rose,  Here is the plan from my note for patient's appointment today:    - Increase Buspar to 20mg BID to hopefully improve anxiety, depressive symptoms, and trauma-related symptoms without causing side effects.  - Discontinue viibyrd d/t side effects  - Continue Seroquel 100mg daily (managed by sleep medicine).   - Pt will be repeating sleep study.   - Continue therapy.  - Refer patient to a private practice closer to his home.

## 2025-06-04 NOTE — PROGRESS NOTES
"     Office  Follow Up Visit      Patient Name: Eric Lay  : 1975   MRN: [id number]     Referring Provider: Jeffery Etienne MD    Chief Complaint:  \"I feel like nothing is working\"    ICD-10-CM ICD-9-CM   1. MDD (major depressive disorder), recurrent episode, moderate  F33.1 296.32   2. Post traumatic stress disorder (PTSD)  F43.10 309.81   3. Generalized anxiety disorder with panic attacks  F41.1 300.02    F41.0 300.01      History of Present Illness:   Eric Lay is a 49 y.o. male who is here today for follow up. Patient was seen for initial evaluation on 2025.  Patient reported a history of PTSD, depression, and anxiety.  Reported that he has been dealing with anxiety, depression, and trauma related symptoms majority of his life.  Patient also reported that he has had difficulty with memory for a little over a year without any known cause (patient did talk to his neurologist about this).  Patient started seeing neurology after experiencing seizure-like episodes that started about a year ago.  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  Patient came to provider taking Zoloft 25 mg daily, trazodone 150 mg nightly, Ambien 12.5 mg nightly, and BuSpar 15 mg twice daily.  Patient has tried to increase Zoloft to 50 mg daily but experienced sexual side effects.  During initial evaluation patient was diagnosed with MDD, PTSD, and DILLON with panic attacks.  Discussed with patient that his memory issues may be related to his depression.  Discussed with patient that we will focus on treating patient's depression and trauma related symptoms which will ultimately hopefully help patient's memory and nonepileptic seizures.  Patient did report during this evaluation that he does experience some vague auditory hallucinations which may be related to his depression.  Patient was started on Rexulti 0.5 mg daily to help with " "depressive symptoms and trauma related symptoms.  Patient was continued on Zoloft 25 mg daily, Ambien 12.5 mg nightly, trazodone 150 mg nightly, and BuSpar 15 mg twice daily.  Notify patient that we would most likely change his antidepressant during next visit.   2/21/2025: Today patient reports some improvement in anxiety, depression, and trauma-related symptoms with the added Rexulti but patient's PHQ-9 and DILLON-7 scores are still very elevated today.  Patient continues to have episodes of \"spacing out\" which neurology believes are non epileptic seizures.  Would like to try patient on a different antidepressant to help with anxiety, depressive symptoms, and trauma related symptoms.  Patient did experience side effects with increasing the Zoloft to 50 mg daily, so will cross-taper patient from Zoloft to Prozac 20 mg daily. Encourage patient to continue with therapy.  Will see patient in 4 weeks.  Will continue Rexulti 0.5 mg daily and BuSpar 15 mg twice daily.  Discussed with patient that we will continue to try to treat his anxiety, depression, and trauma related symptoms which will hopefully ultimately treat his nonepileptic seizures and memory issues.  Safety plan reviewed with the patient can be found in patient's chart.  Referral placed to sleep medicine to rule out sleep apnea.   3/20/25: Today patient reports to still be struggling with anxiety, depressive symptoms, and trauma related symptoms.  Patient's PHQ-9 and DILLON-7 scores are elevated today.  States he has not noticed any improvement with the Prozac and is experiencing sexual side effects with Prozac.  Will switch patient to an antidepressant that is less likely to cause sexual side effects.  Will discontinue Prozac and start patient on Pristiq 50 mg daily.  Hopefully Pristiq will help with some of patient's fatigue and lack of motivation depressive type symptoms.  Pristiq should also help with patient's anxiety and trauma related symptoms. Will " continue Rexulti 0.5 mg daily, BuSpar 15 mg twice daily, trazodone 150 mg nightly, and Ambien 12.5 mg nightly as needed for sleep for now.  Safety plan completed with patient and can be found in patient's chart.  Hopefully patient's memory improves as his trauma symptoms and depressive symptoms improve but would like patient to reach back out to neurology for further dementia evaluation or neuropsych testing to rule out dementia.  Patient will be seeing sleep medicine in April to rule out sleep apnea.   4/3/25 (Patient call): Called and spoke with pt regarding his sexual side effects with the Pristiq. Pt has not taken Pristiq since Tuesday. Will discontinue Pristiq and start Viibyrd 10mg daily.  Sexual side effects are less likely to be experienced with Viibryd.  Discussed benefits versus risks of the medication.  Discussed side effects of the medication.   4/22/25: Today patient reports to still be struggling with anxiety and depression.  Patient's Ambien was discontinued by PCP.  Patient did see sleep medicine several weeks ago and was advised to stop all sleep aids and trazodone.  Sleep study was completed but has not resulted yet.  Sleep medicine added Seroquel 50 mg daily.  Today patient seems very frustrated that his Ambien was taken away.  Patient also admits that he is still taking 4 tablets of Benadryl nightly and has been for years and is unsure of what dose he is really taking.  Discussed with patient that sleep medicine advised for him to stop taking all sleep aids other than the Seroquel that was prescribed.  Encouraged patient to be compliant with sleep medicine's plan for him.  Will discontinue Rexulti so that sleep medicine can better optimize their Seroquel dose if they need to.  Will increase patient's Viibryd to 20 mg daily to further improve anxiety, trauma-related symptoms, and depressive symptoms.  Patient's anxiety and depressive symptoms seem to be primarily related to his lack of quality of  life with his physical//medical issues and sleep problems.  With all the changes in patient's sleep medications, he is only sleeping 1 hour less than he usually would with all the sleep aids.  Encourage patient to continue with therapy but patient states that he will not be continuing therapy in clinic.  Discussed with patient that he needs to be compliant with sleep medicine and therapy to remain a patient here as his problems with anxiety/depression are unlikely to be successfully treated with medication alone.  Will see patient in 4 weeks but offered patient a list of other behavioral health clinics to get a second opinion if he wishes to but patient declined to take the list of referrals.  Safety plan reviewed with patient and can be found below.     Today pt is currently taking viibyrd 20mg daily, Buspar 15mg BID, and Seroquel 100mg daily. Pt did complete his sleep study but another was recommended and has not been completed yet. States that he has been having diarrhea.  Patient feels like his medications are not working for him.  Reports to still struggle with anxiety and depressive symptoms.  Patient feels depressed because he physically is incapable of doing things that he wants to do and is only sleeping 3 hours each night.  States that even if he does get diagnosed sleep apnea he will not wear a CPAP machine.  Reports occasional thoughts of death but denies any intent or plan to harm himself.  Patient denies SI/HI/AVH in clinic today.  Reports a poor appetite.  Denies any side effects of the diarrhea.    Subjective      Review of Systems:   Review of Systems   Constitutional:  Negative for appetite change.   Respiratory:  Negative for chest tightness and shortness of breath.    Gastrointestinal:  Negative for abdominal pain, constipation, diarrhea, nausea and vomiting.   Genitourinary:  Negative for difficulty urinating.   Musculoskeletal:         Chronic pain   Neurological:  Negative for headaches.    Psychiatric/Behavioral:  Negative for hallucinations and suicidal ideas. The patient is nervous/anxious.         Depressed     PHQ-9 Depression Screening  Little interest or pleasure in doing things? More than half the days   Feeling down, depressed, or hopeless? More than half the days   PHQ-2 Total Score 4   Trouble falling or staying asleep, or sleeping too much? Nearly every day   Feeling tired or having little energy? Nearly every day   Poor appetite or overeating? Nearly every day   Feeling bad about yourself - or that you are a failure or have let yourself or your family down? Nearly every day   Trouble concentrating on things, such as reading the newspaper or watching television? Nearly every day   Moving or speaking so slowly that other people could have noticed? Or the opposite - being so fidgety or restless that you have been moving around a lot more than usual? More than half the days     Thoughts that you would be better off dead, or of hurting yourself in some way? More than half the days   PHQ-9 Total Score 23   If you checked off any problems, how difficult have these problems made it for you to do your work, take care of things at home, or get along with other people? Very difficult       DILLON-7      Over the last two weeks, how often have you been bothered by the following problems?  Not being able to stop or control worrying: Nearly every day  Worrying too much about different things: Nearly every day  Trouble Relaxing: Nearly every day  Being so restless that it is hard to sit still: More than half the days  Becoming easily annoyed or irritable: Nearly every day  Feeling afraid as if something awful might happen: Nearly every day  DILLON 7 Total Score: 17  If you checked any problems, how difficult have these problems made it for you to do your work, take care of things at home, or get along with other people: Very difficult    Patient History:   The following portions of the patient's history were  "reviewed and updated as appropriate: allergies, current medications, past family history, past medical history, past social history, past surgical history and problem list.     Social History     Socioeconomic History    Marital status:      Spouse name: Angelita    Years of education: High school   Tobacco Use    Smoking status: Every Day     Current packs/day: 1.00     Average packs/day: 1 pack/day for 25.0 years (25.0 ttl pk-yrs)     Types: Cigarettes    Smokeless tobacco: Never   Vaping Use    Vaping status: Never Used   Substance and Sexual Activity    Alcohol use: Never    Drug use: Not Currently     Types: Marijuana    Sexual activity: Yes     Partners: Female     Birth control/protection: Post-menopausal, None     Comment:      Medications:     Current Outpatient Medications:     ciclopirox (LOPROX) 0.77 % cream, Apply 1 Application topically to the appropriate area as directed 2 (Two) Times a Day., Disp: 30 g, Rfl: 0    HYDROcodone-acetaminophen (NORCO) 7.5-325 MG per tablet, Take 1 tablet by mouth 3 times a day., Disp: , Rfl:     meloxicam (MOBIC) 15 MG tablet, Take 1 tablet by mouth Daily., Disp: 90 tablet, Rfl: 1    naloxone (NARCAN) 4 MG/0.1ML nasal spray, Administer 1 spray into the nostril(s) as directed by provider As Needed., Disp: , Rfl:     QUEtiapine (SEROquel) 50 MG tablet, Take 2 tablets by mouth Every Night for 90 days., Disp: 60 tablet, Rfl: 2    busPIRone (BUSPAR) 10 MG tablet, Take 2 tablets by mouth 2 (Two) Times a Day for 60 days., Disp: 120 tablet, Rfl: 1    Objective     Physical Exam:  Vital Signs:   Vitals:    06/04/25 1027   BP: 133/92   Pulse: 89   SpO2: 97%   Weight: 99.9 kg (220 lb 3.2 oz)   Height: 188 cm (74.02\")     Body mass index is 28.26 kg/m².     Mental Status Exam:   MENTAL STATUS EXAM   General Appearance:  Cleanly groomed and dressed  Eye Contact:  Good eye contact  Attitude:  Cooperative  Motor Activity:  Normal gait, posture  Muscle Strength:  " Normal  Speech:  Normal rate, tone, volume  Language:  Spontaneous  Mood and affect:  Anxious, depressed and frustrated  Thought Process:  Logical  Associations/ Thought Content:  No delusions  Hallucinations:  None  Suicidal Ideations:  Not present  Homicidal Ideation:  Not present  Sensorium:  Alert and clear  Orientation:  Person, place, time and situation  Attention Span/ Concentration:  Good  Fund of Knowledge:  Appropriate for age and educational level  Intellectual Functioning:  Average range  Insight:  Fair  Judgement:  Fair  Reliability:  Fair  Impulse Control:  Good       @RESULASTCBCDIFFPANEL,TSH,LABLIPI,RYOIYZNA41,SUNFHNPQ97,MG,FOLATE,PROLACTIN,CRPRESULT,CMP,T1UXNIBYUOY)@    Lab Results   Component Value Date    GLUCOSE 93 01/29/2025    BUN 19 01/29/2025    CREATININE 1.33 (H) 01/29/2025    EGFR 64.7 01/29/2025    BCR 14.3 01/29/2025    K 4.8 01/29/2025    CO2 22.6 01/29/2025    CALCIUM 9.8 01/29/2025    ALBUMIN 4.4 01/29/2025    BILITOT 0.5 01/29/2025    AST 19 01/29/2025    ALT 34 01/29/2025       Lab Results   Component Value Date    WBC 11.26 (H) 01/29/2025    HGB 17.6 01/29/2025    HCT 52.5 (H) 01/29/2025    MCV 92.1 01/29/2025     01/29/2025       Lab Results   Component Value Date    CHLPL 216 (H) 01/29/2025    TRIG 111 01/29/2025    HDL 38 (L) 01/29/2025     (H) 01/29/2025           TSH          1/29/2025    08:35   TSH   TSH 2.510           Assessment / Plan      Assessment:   Patient is a 49-year-old male with a history of MDD, PTSD, and DILLON with panic attacks.  Patient was last seen in clinic on 4/22/2025 when patient reported still be struggling with anxiety depressive symptoms.  Patient's Viibryd was increased to 20 mg daily to hopefully improve anxiety, trauma-related symptoms, and depressive symptoms.  Patient's PCP did discontinue his Ambien.  Patient was advised by sleep medicine to stop all sleeping aids and trazodone.  Sleep medicine started patient on Seroquel 50 mg  daily.  A sleep study was ordered by sleep medicine but has not resulted yet.  Rexulti was discontinued so that sleep medicine could increase the Seroquel dosage if needed.  Patient was encouraged to be compliant with sleep medicine's plan and therapy as his problems with anxiety/depression are unlikely to be successfully treated with medicine alone.  Patient was continued on Seroquel 50 mg daily and BuSpar 50 mg twice daily.  Today patient is currently taking Viibryd 20 mg daily, Seroquel 100 mg daily, and BuSpar 15 mg twice daily.  Patient reports to be experiencing diarrhea.  Discussed with patient that it is most likely the Viibryd causing this, will discontinue Viibryd.  Patient is frustrated that nothing seems to be working for his depressive symptoms.  Discussed with patient that we have not been able to keep him on antidepressant long enough/at a good dose due to the side effects that he is experiencing.  Has been experiencing sexual side effects with several antidepressants that have been tried.  Discussed with patient that we may need to treat his sexual side effects that he experiences with antidepressants if we run out of alternatives.  Patient states that he would rather be depressed/anxious than be treated for sexual side effects related to the medication that treats this mental health. Will better optimize patient's BuSpar dose and increase it to 20 mg twice daily to hopefully improve anxiety, depressive symptoms, and trauma-related symptoms.  Patient Seroquel was recently increased to 100 mg daily by sleep medicine.  Sleep medicine did order a repeat sleep study.  Patient is frustrated that he is not sleeping well.  Discussed with patient that if he does have sleep apnea the best treatment will be to treat his sleep apnea with the CPAP machine.  Patient states that even if he is diagnosed with sleep apnea he will not wear a CPAP machine.  Discussed with patient that he has told me that his sleep  issues and his physical pain are what he believes is causing his depression.  Discussed with patient that the likelihood of his symptoms of depression, anxiety and trauma-related symptoms improving without optimal sleep is slim.  Encouraged patient to continue with therapy.  Discussed with patient that this provider will be going on leave for a few months and that he needs to be seen at the [facility] behavioral [facility] location but patient declines to be seen at that location.  Declines to be seen on telehealth as well.  Will refer patient to a private practice close to his house.  Safety plan reviewed with patient and can be found below.    Diagnoses and all orders for this visit:    1. MDD (major depressive disorder), recurrent episode, moderate (Primary)  -     Ambulatory Referral to Psychiatry  -     Ambulatory Referral to Psychology    2. Post traumatic stress disorder (PTSD)  -     Ambulatory Referral to Psychiatry  -     Ambulatory Referral to Psychology    3. Generalized anxiety disorder with panic attacks  -     Ambulatory Referral to Psychiatry  -     Ambulatory Referral to Psychology    Other orders  -     busPIRone (BUSPAR) 10 MG tablet; Take 2 tablets by mouth 2 (Two) Times a Day for 60 days.  Dispense: 120 tablet; Refill: 1       Plan:   - Increase Buspar to 20mg BID to hopefully improve anxiety, depressive symptoms, and trauma-related symptoms without causing side effects.  - Discontinue viibyrd d/t side effects  - Continue Seroquel 100mg daily (managed by sleep medicine).   - Pt will be repeating sleep study.   - Continue therapy.  - Refer patient to a private practice closer to his home.  - Discussed medication options and treatment plan of prescribed medication as well as the risks, benefits, and side effects   - Encouraged pt to continue supportive psychotherapy efforts and medications as indicated.  - Educated pt on signs and symptoms of serotonin syndrome and notified pt to go to the ER  if experiencing these symptoms.   - Notified pt that antidepressants can sometimes cause worsening SI and to monitor for this.   - Notified pt that antipsychotics can increase cholesterol levels, blood sugar, wt, and BP.   - Educated pt on EPS/TD and to notify provider is they experience these symptoms.      Short-term goals: Continue to develop rapport with patient.     Long-term goals: Symptom reduction with medication and therapy.     Weakness: Tobacco use.     Strengths: Great support from wife.    Continue supportive psychotherapy efforts and medications as indicated. Treatment and medication options discussed during today's visit. Patient ackowledged and verbally consented to continue with current treatment plan and was educated on the importance of compliance with treatment and follow-up appointments. Patient seems reasonably able to adhere to treatment plan.      Medication Considerations:  Discussed medication options and treatment plan of prescribed medication(s) as well as the risks, benefits, and potential side effects. Patient is agreeable to call the office with any worsening of symptoms or onset of side effects. Patient is agreeable to call 911 or go to the nearest ER should he/she begin having SI/HI.    Quality Measures:   Patient does smoke 1 pack a day. Provided education on smoking cessation and risk of using tobacco products.    Dread reviewed and is appropriate.    Follow Up:   Return Referring patient to a clinic closer to his house., for Recheck.    Copied text in this note has been reviewed and is accurate as of 6/4/2025.    Step 1: Warning signs:  Warning Signs   Negative thoughts      Step 2: Internal coping strategies - Things I can do to take my mind off my problems without contacting another person:  Strategies   Smoke a cigarette.      Step 3: People and social settings that provide distraction:  Name Contact Information   Angelita Lay          Step 4: People whom I can ask for help  during a crisis:  Name Contact Information   Angelita Lay       Step 5: Professionals or agencies I can contact during a crisis:  Clinician/Agency Name Phone Emergency Contact   suicide Hotline     [facility]        Suicide Prevention Lifeline Phone: Call or Text 164  Crisis Text Line: Text HOME to 798181     Step 6: Making the environment safer (plan for lethal means safety):  Did not identify any lethal methods     Optional: What is most important to me and worth living for?:  Not being invisible to family    Part of this note may be an electronic transcription/translation of spoken language to printed text using the Dragon Dictation System.    PATRICIA Covington, PMHNP-BC

## 2025-06-18 ENCOUNTER — TELEPHONE (OUTPATIENT)
Dept: SLEEP MEDICINE | Facility: HOSPITAL | Age: 50
End: 2025-06-18
Payer: MEDICAID

## 2025-06-26 ENCOUNTER — HOSPITAL ENCOUNTER (OUTPATIENT)
Dept: SLEEP MEDICINE | Facility: HOSPITAL | Age: 50
Discharge: HOME | End: 2025-06-26
Admitting: INTERNAL MEDICINE
Payer: MEDICAID

## 2025-06-26 PROCEDURE — G0399 HOME SLEEP TEST/TYPE 3 PORTA: HCPCS
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
## 2025-07-16 ENCOUNTER — OFFICE VISIT (OUTPATIENT)
Age: 50
End: 2025-07-16
Payer: MEDICAID

## 2025-07-16 VITALS
HEIGHT: 74 IN | DIASTOLIC BLOOD PRESSURE: 87 MMHG | HEART RATE: 89 BPM | WEIGHT: 216 LBS | SYSTOLIC BLOOD PRESSURE: 130 MMHG | OXYGEN SATURATION: 95 % | BODY MASS INDEX: 27.72 KG/M2

## 2025-07-16 DIAGNOSIS — F41.1 GENERALIZED ANXIETY DISORDER: Chronic | ICD-10-CM

## 2025-07-16 DIAGNOSIS — G47.9 SLEEP DISTURBANCE: Chronic | ICD-10-CM

## 2025-07-16 DIAGNOSIS — F33.3 SEVERE EPISODE OF RECURRENT MAJOR DEPRESSIVE DISORDER, WITH PSYCHOTIC FEATURES: Primary | Chronic | ICD-10-CM

## 2025-07-16 DIAGNOSIS — F43.10 POST TRAUMATIC STRESS DISORDER (PTSD): Chronic | ICD-10-CM

## 2025-07-16 DIAGNOSIS — Z87.898 HISTORY OF ALCOHOL USE DISORDER: Chronic | ICD-10-CM

## 2025-07-16 PROCEDURE — 1159F MED LIST DOCD IN RCRD: CPT | Performed by: NURSE PRACTITIONER

## 2025-07-16 PROCEDURE — 1160F RVW MEDS BY RX/DR IN RCRD: CPT | Performed by: NURSE PRACTITIONER

## 2025-07-16 PROCEDURE — 90792 PSYCH DIAG EVAL W/MED SRVCS: CPT | Performed by: NURSE PRACTITIONER

## 2025-07-16 RX ORDER — DULOXETIN HYDROCHLORIDE 30 MG/1
CAPSULE, DELAYED RELEASE ORAL
Qty: 42 CAPSULE | Refills: 0 | Status: SHIPPED | OUTPATIENT
Start: 2025-07-16 | End: 2025-08-13

## 2025-07-16 RX ORDER — QUETIAPINE FUMARATE 50 MG/1
100 TABLET, FILM COATED ORAL NIGHTLY
Qty: 60 TABLET | Refills: 2 | Status: SHIPPED | OUTPATIENT
Start: 2025-07-16 | End: 2025-10-14

## 2025-08-13 ENCOUNTER — OFFICE VISIT (OUTPATIENT)
Age: 50
End: 2025-08-13
Payer: MEDICAID

## 2025-08-13 VITALS
DIASTOLIC BLOOD PRESSURE: 71 MMHG | SYSTOLIC BLOOD PRESSURE: 116 MMHG | BODY MASS INDEX: 27.72 KG/M2 | WEIGHT: 216 LBS | HEART RATE: 98 BPM | OXYGEN SATURATION: 94 % | HEIGHT: 74 IN

## 2025-08-13 DIAGNOSIS — F41.1 GENERALIZED ANXIETY DISORDER: Chronic | ICD-10-CM

## 2025-08-13 DIAGNOSIS — G47.9 SLEEP DISTURBANCE: Chronic | ICD-10-CM

## 2025-08-13 DIAGNOSIS — Z87.898 HISTORY OF ALCOHOL USE DISORDER: Chronic | ICD-10-CM

## 2025-08-13 DIAGNOSIS — F33.3 SEVERE EPISODE OF RECURRENT MAJOR DEPRESSIVE DISORDER, WITH PSYCHOTIC FEATURES: Primary | Chronic | ICD-10-CM

## 2025-08-13 DIAGNOSIS — F43.10 POST TRAUMATIC STRESS DISORDER (PTSD): Chronic | ICD-10-CM

## 2025-08-13 PROCEDURE — 99214 OFFICE O/P EST MOD 30 MIN: CPT | Performed by: NURSE PRACTITIONER

## 2025-08-13 PROCEDURE — 1160F RVW MEDS BY RX/DR IN RCRD: CPT | Performed by: NURSE PRACTITIONER

## 2025-08-13 PROCEDURE — 1159F MED LIST DOCD IN RCRD: CPT | Performed by: NURSE PRACTITIONER

## 2025-08-13 RX ORDER — LAMOTRIGINE 25 MG/1
TABLET ORAL
Qty: 30 TABLET | Refills: 0 | Status: SHIPPED | OUTPATIENT
Start: 2025-08-13 | End: 2025-09-10

## 2025-08-13 RX ORDER — DULOXETIN HYDROCHLORIDE 60 MG/1
60 CAPSULE, DELAYED RELEASE ORAL NIGHTLY
Qty: 30 CAPSULE | Refills: 2 | Status: SHIPPED | OUTPATIENT
Start: 2025-08-13